# Patient Record
Sex: FEMALE | Race: WHITE | Employment: OTHER | URBAN - METROPOLITAN AREA
[De-identification: names, ages, dates, MRNs, and addresses within clinical notes are randomized per-mention and may not be internally consistent; named-entity substitution may affect disease eponyms.]

---
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
## 2021-03-19 ENCOUNTER — HOSPITAL ENCOUNTER (EMERGENCY)
Age: 21
Discharge: HOME | End: 2021-03-19
Attending: EMERGENCY MEDICINE
Payer: COMMERCIAL

## 2021-03-19 ENCOUNTER — APPOINTMENT (OUTPATIENT)
Dept: GENERAL RADIOLOGY | Age: 21
End: 2021-03-19
Attending: PHYSICIAN ASSISTANT
Payer: COMMERCIAL

## 2021-03-19 VITALS
TEMPERATURE: 97.9 F | SYSTOLIC BLOOD PRESSURE: 91 MMHG | HEIGHT: 62 IN | DIASTOLIC BLOOD PRESSURE: 73 MMHG | OXYGEN SATURATION: 100 % | HEART RATE: 90 BPM | WEIGHT: 166.23 LBS | RESPIRATION RATE: 30 BRPM | BODY MASS INDEX: 30.59 KG/M2

## 2021-03-19 DIAGNOSIS — K92.0 HEMATEMESIS WITH NAUSEA: Primary | ICD-10-CM

## 2021-03-19 DIAGNOSIS — R11.2 NON-INTRACTABLE VOMITING WITH NAUSEA, UNSPECIFIED VOMITING TYPE: ICD-10-CM

## 2021-03-19 DIAGNOSIS — U07.1 COVID-19: ICD-10-CM

## 2021-03-19 LAB
ALBUMIN SERPL-MCNC: 3.2 G/DL (ref 3.5–5)
ALBUMIN/GLOB SERPL: 0.7 {RATIO} (ref 1.1–2.2)
ALP SERPL-CCNC: 129 U/L (ref 45–117)
ALT SERPL-CCNC: 21 U/L (ref 12–78)
ANION GAP SERPL CALC-SCNC: 4 MMOL/L (ref 5–15)
APPEARANCE UR: CLEAR
AST SERPL-CCNC: 17 U/L (ref 15–37)
BACTERIA URNS QL MICRO: NEGATIVE /HPF
BASOPHILS # BLD: 0.1 K/UL (ref 0–0.1)
BASOPHILS NFR BLD: 1 % (ref 0–1)
BILIRUB SERPL-MCNC: 0.2 MG/DL (ref 0.2–1)
BILIRUB UR QL: NEGATIVE
BUN SERPL-MCNC: 10 MG/DL (ref 6–20)
BUN/CREAT SERPL: 16 (ref 12–20)
CALCIUM SERPL-MCNC: 8.9 MG/DL (ref 8.5–10.1)
CHLORIDE SERPL-SCNC: 104 MMOL/L (ref 97–108)
CO2 SERPL-SCNC: 28 MMOL/L (ref 21–32)
COLOR UR: ABNORMAL
CREAT SERPL-MCNC: 0.61 MG/DL (ref 0.55–1.02)
DIFFERENTIAL METHOD BLD: NORMAL
EOSINOPHIL # BLD: 0.1 K/UL (ref 0–0.4)
EOSINOPHIL NFR BLD: 1 % (ref 0–7)
EPITH CASTS URNS QL MICRO: ABNORMAL /LPF
ERYTHROCYTE [DISTWIDTH] IN BLOOD BY AUTOMATED COUNT: 13 % (ref 11.5–14.5)
GLOBULIN SER CALC-MCNC: 4.4 G/DL (ref 2–4)
GLUCOSE SERPL-MCNC: 108 MG/DL (ref 65–100)
GLUCOSE UR STRIP.AUTO-MCNC: NEGATIVE MG/DL
HCG UR QL: NEGATIVE
HCT VFR BLD AUTO: 41.2 % (ref 35–47)
HGB BLD-MCNC: 13.3 G/DL (ref 11.5–16)
HGB UR QL STRIP: NEGATIVE
IMM GRANULOCYTES # BLD AUTO: 0 K/UL (ref 0–0.04)
IMM GRANULOCYTES NFR BLD AUTO: 0 % (ref 0–0.5)
KETONES UR QL STRIP.AUTO: NEGATIVE MG/DL
LEUKOCYTE ESTERASE UR QL STRIP.AUTO: NEGATIVE
LYMPHOCYTES # BLD: 2.1 K/UL (ref 0.8–3.5)
LYMPHOCYTES NFR BLD: 42 % (ref 12–49)
MCH RBC QN AUTO: 28.8 PG (ref 26–34)
MCHC RBC AUTO-ENTMCNC: 32.3 G/DL (ref 30–36.5)
MCV RBC AUTO: 89.2 FL (ref 80–99)
MONOCYTES # BLD: 0.6 K/UL (ref 0–1)
MONOCYTES NFR BLD: 12 % (ref 5–13)
NEUTS SEG # BLD: 2.2 K/UL (ref 1.8–8)
NEUTS SEG NFR BLD: 44 % (ref 32–75)
NITRITE UR QL STRIP.AUTO: NEGATIVE
NRBC # BLD: 0 K/UL (ref 0–0.01)
NRBC BLD-RTO: 0 PER 100 WBC
PH UR STRIP: 7.5 [PH] (ref 5–8)
PLATELET # BLD AUTO: 336 K/UL (ref 150–400)
PMV BLD AUTO: 9.3 FL (ref 8.9–12.9)
POTASSIUM SERPL-SCNC: 3.9 MMOL/L (ref 3.5–5.1)
PROT SERPL-MCNC: 7.6 G/DL (ref 6.4–8.2)
PROT UR STRIP-MCNC: NEGATIVE MG/DL
RBC # BLD AUTO: 4.62 M/UL (ref 3.8–5.2)
RBC #/AREA URNS HPF: ABNORMAL /HPF (ref 0–5)
RBC MORPH BLD: NORMAL
SODIUM SERPL-SCNC: 136 MMOL/L (ref 136–145)
SP GR UR REFRACTOMETRY: 1.03 (ref 1–1.03)
TROPONIN I SERPL-MCNC: <0.05 NG/ML
UA: UC IF INDICATED,UAUC: ABNORMAL
UROBILINOGEN UR QL STRIP.AUTO: 0.2 EU/DL (ref 0.2–1)
WBC # BLD AUTO: 5.1 K/UL (ref 3.6–11)
WBC URNS QL MICRO: ABNORMAL /HPF (ref 0–4)

## 2021-03-19 PROCEDURE — 36415 COLL VENOUS BLD VENIPUNCTURE: CPT

## 2021-03-19 PROCEDURE — 96375 TX/PRO/DX INJ NEW DRUG ADDON: CPT

## 2021-03-19 PROCEDURE — 84484 ASSAY OF TROPONIN QUANT: CPT

## 2021-03-19 PROCEDURE — 74011250636 HC RX REV CODE- 250/636: Performed by: EMERGENCY MEDICINE

## 2021-03-19 PROCEDURE — 99284 EMERGENCY DEPT VISIT MOD MDM: CPT

## 2021-03-19 PROCEDURE — 74011000250 HC RX REV CODE- 250: Performed by: EMERGENCY MEDICINE

## 2021-03-19 PROCEDURE — 96374 THER/PROPH/DIAG INJ IV PUSH: CPT

## 2021-03-19 PROCEDURE — 71045 X-RAY EXAM CHEST 1 VIEW: CPT

## 2021-03-19 PROCEDURE — 93005 ELECTROCARDIOGRAM TRACING: CPT

## 2021-03-19 PROCEDURE — 85025 COMPLETE CBC W/AUTO DIFF WBC: CPT

## 2021-03-19 PROCEDURE — 81025 URINE PREGNANCY TEST: CPT

## 2021-03-19 PROCEDURE — 80053 COMPREHEN METABOLIC PANEL: CPT

## 2021-03-19 PROCEDURE — 81001 URINALYSIS AUTO W/SCOPE: CPT

## 2021-03-19 PROCEDURE — 96361 HYDRATE IV INFUSION ADD-ON: CPT

## 2021-03-19 RX ORDER — FAMOTIDINE 20 MG/1
20 TABLET, FILM COATED ORAL 2 TIMES DAILY
Qty: 28 TAB | Refills: 0 | Status: SHIPPED | OUTPATIENT
Start: 2021-03-19 | End: 2021-04-02

## 2021-03-19 RX ORDER — NORGESTIMATE AND ETHINYL ESTRADIOL 0.25-0.035
1 KIT ORAL DAILY
Status: ON HOLD | COMMUNITY
End: 2022-08-05

## 2021-03-19 RX ORDER — QUETIAPINE FUMARATE 300 MG/1
300 TABLET, FILM COATED ORAL 2 TIMES DAILY
COMMUNITY
End: 2022-06-03

## 2021-03-19 RX ORDER — ONDANSETRON 4 MG/1
4 TABLET, ORALLY DISINTEGRATING ORAL
Qty: 21 TAB | Refills: 0 | Status: SHIPPED | OUTPATIENT
Start: 2021-03-19 | End: 2021-03-26

## 2021-03-19 RX ORDER — ONDANSETRON 2 MG/ML
4 INJECTION INTRAMUSCULAR; INTRAVENOUS
Status: DISCONTINUED | OUTPATIENT
Start: 2021-03-19 | End: 2021-03-20 | Stop reason: HOSPADM

## 2021-03-19 RX ORDER — LEVONORGESTREL 19.5 MG/1
INTRAUTERINE DEVICE INTRAUTERINE
COMMUNITY

## 2021-03-19 RX ADMIN — SODIUM CHLORIDE 1000 ML: 9 INJECTION, SOLUTION INTRAVENOUS at 19:53

## 2021-03-19 RX ADMIN — ONDANSETRON 4 MG: 2 INJECTION INTRAMUSCULAR; INTRAVENOUS at 19:53

## 2021-03-19 RX ADMIN — FAMOTIDINE 20 MG: 10 INJECTION INTRAVENOUS at 20:23

## 2021-03-20 ENCOUNTER — PATIENT OUTREACH (OUTPATIENT)
Dept: CASE MANAGEMENT | Age: 21
End: 2021-03-20

## 2021-03-20 LAB
ATRIAL RATE: 105 BPM
CALCULATED P AXIS, ECG09: 53 DEGREES
CALCULATED R AXIS, ECG10: 58 DEGREES
CALCULATED T AXIS, ECG11: 3 DEGREES
DIAGNOSIS, 93000: NORMAL
P-R INTERVAL, ECG05: 144 MS
Q-T INTERVAL, ECG07: 316 MS
QRS DURATION, ECG06: 82 MS
QTC CALCULATION (BEZET), ECG08: 417 MS
VENTRICULAR RATE, ECG03: 105 BPM

## 2021-03-20 NOTE — ED PROVIDER NOTES
EMERGENCY DEPARTMENT HISTORY AND PHYSICAL EXAM      Date: 3/19/2021  Patient Name: Myesha Walls    History of Presenting Illness     Chief Complaint   Patient presents with    Vomiting     pt states she has been vomiting blood since this morning, pt states she is COVID19 positive since 3/17/2021.  Positive For Covid-19    Chest Congestion       History Provided By: Patient    HPI: Myesha Walls, 24 y.o. female  With past medical history of oral and personality disorder, migraine headaches presenting today with vomiting. The patient says that she was diagnosed with Covid 2 days ago. She has had 2 days where she had vomiting with blood in her emesis. She notes that she has had a violent emesis and notes that the blood in her vomit was bright red. She denies any recent NSAID use or aspirin use. No history of alcohol use. The patient says that she has mild chest discomfort, and some mild epigastric abdominal discomfort. Currently she says that she is hungry and wishes to have something to eat. She has not had any fevers at home. No cough or shortness of breath associated with this recent diagnosis of Covid. There are no other complaints, changes, or physical findings at this time. PCP: Sanjiv Andrade NP    No current facility-administered medications on file prior to encounter. Current Outpatient Medications on File Prior to Encounter   Medication Sig Dispense Refill    norgestimate-ethinyl estradioL (Sprintec, 28,) 0.25-35 mg-mcg tab Take 1 Tab by mouth daily.  QUEtiapine (SEROquel) 300 mg tablet Take 300 mg by mouth two (2) times a day.  buPROPion SR (Wellbutrin SR) 150 mg SR tablet Take 150 mg by mouth two (2) times a day.  metFORMIN (GLUMETZA ER) 500 mg TG24 24 hour tablet Take  by mouth.  escitalopram oxalate (LEXAPRO) 20 mg tablet Take 10 mg by mouth daily. Indications: Depression      guanFACINE ER (INTUNIV) 3 mg ER tablet Take 3 mg by mouth daily.       lamoTRIgine (LAMICTAL) 200 mg tablet Take 200 mg by mouth nightly.  levonorgestreL (Kyleena) 17.5 mcg/24 hrs (5 yrs) 19.5 mg IUD IUD Kyleena 17.5 mcg/24 hrs (5yrs) 19.5mg intrauterine device   Take by intrauterine route.  EPINEPHrine (EPIPEN) 0.3 mg/0.3 mL injection 0.3 mg by IntraMUSCular route once as needed. Past History     Past Medical History:  Past Medical History:   Diagnosis Date    ADHD (attention deficit hyperactivity disorder)     Asthma     exercise induced    Bipolar affective ([address]     Borderline personality disorder (HCC)     GERD (gastroesophageal reflux disease)     Nicotine vapor product user     Psychiatric disorder     anxiety, depression, SI's, bipolar depression    Suicidal thoughts        Past Surgical History:  Past Surgical History:   Procedure Laterality Date    HX CHOLECYSTECTOMY      HX HEENT      foreign body removal from ear with ketamine       Family History:  No family history on file. Social History:  Social History     Tobacco Use    Smoking status: Never Smoker    Smokeless tobacco: Current User    Tobacco comment: Vaping   Substance Use Topics    Alcohol use: Not Currently     Comment: maybe three times a year    Drug use: No       Allergies: Allergies   Allergen Reactions    Bee Sting [Sting, Bee] Anaphylaxis    Poison Ivy Extract Anaphylaxis         Review of Systems   Constitutional: No  fever  Skin: No  rash  HEENT: No  nasal congestion  Resp: No cough  CV: No chest pain  GI: + vomiting  : No dysuria  MSK: No joint pain  Neuro: No numbness  Psych: No anxiety      Physical Exam     Patient Vitals for the past 12 hrs:   Temp Pulse Resp BP SpO2   03/19/21 1948 97.9 °F (36.6 °C) 90 30 91/73 100 %   03/19/21 1751 97.9 °F (36.6 °C) (!) 110 20 121/79 99 %     General: alert, No acute distress  Eyes: EOMI, normal conjunctiva  ENT: moist mucous membranes. Neck: Active, full ROM of neck. Skin: No rashes. no jaundice              Lungs: Equal chest expansion. no respiratory distress. clear to auscultation bilaterally No accessory muscle usage  Heart: regular rate     no peripheral edema   2+ radial pulses  bilaterally  Abd:  non distended soft, nontender. No rebound tenderness. No guarding  Back: Full ROM  MSK: Full, active ROM in all 4 extremities.    Neuro: Alert and oriented to Person, Place, Time and Situation; normal speech;   Psych: Cooperative with exam; Appropriate mood and affect           Diagnostic Study Results     Labs -     Recent Results (from the past 12 hour(s))   URINALYSIS W/ REFLEX CULTURE    Collection Time: 03/19/21  5:58 PM    Specimen: Urine   Result Value Ref Range    Color YELLOW/STRAW      Appearance CLEAR CLEAR      Specific gravity 1.027 1.003 - 1.030      pH (UA) 7.5 5.0 - 8.0      Protein Negative NEG mg/dL    Glucose Negative NEG mg/dL    Ketone Negative NEG mg/dL    Bilirubin Negative NEG      Blood Negative NEG      Urobilinogen 0.2 0.2 - 1.0 EU/dL    Nitrites Negative NEG      Leukocyte Esterase Negative NEG      WBC 5-10 0 - 4 /hpf    RBC 0-5 0 - 5 /hpf    Epithelial cells MANY (A) FEW /lpf    Bacteria Negative NEG /hpf    UA:UC IF INDICATED CULTURE NOT INDICATED BY UA RESULT CNI     EKG, 12 LEAD, INITIAL    Collection Time: 03/19/21  5:58 PM   Result Value Ref Range    Ventricular Rate 105 BPM    Atrial Rate 105 BPM    P-R Interval 144 ms    QRS Duration 82 ms    Q-T Interval 316 ms    QTC Calculation (Bezet) 417 ms    Calculated P Axis 53 degrees    Calculated R Axis 58 degrees    Calculated T Axis 3 degrees    Diagnosis       Sinus tachycardia  Possible Left atrial enlargement  Nonspecific T wave abnormality  When compared with ECG of 26-AUG-2020 09:34,  Nonspecific T wave abnormality now evident in Lateral leads     HCG URINE, QL. - POC    Collection Time: 03/19/21  5:59 PM   Result Value Ref Range    Pregnancy test,urine (POC) Negative NEG     CBC WITH AUTOMATED DIFF    Collection Time: 03/19/21  6:05 PM   Result Value Ref Range    WBC 5.1 3.6 - 11.0 K/uL    RBC 4.62 3.80 - 5.20 M/uL    HGB 13.3 11.5 - 16.0 g/dL    HCT 41.2 35.0 - 47.0 %    MCV 89.2 80.0 - 99.0 FL    MCH 28.8 26.0 - 34.0 PG    MCHC 32.3 30.0 - 36.5 g/dL    RDW 13.0 11.5 - 14.5 %    PLATELET 442 292 - 695 K/uL    MPV 9.3 8.9 - 12.9 FL    NRBC 0.0 0  WBC    ABSOLUTE NRBC 0.00 0.00 - 0.01 K/uL    NEUTROPHILS 44 32 - 75 %    LYMPHOCYTES 42 12 - 49 %    MONOCYTES 12 5 - 13 %    EOSINOPHILS 1 0 - 7 %    BASOPHILS 1 0 - 1 %    IMMATURE GRANULOCYTES 0 0.0 - 0.5 %    ABS. NEUTROPHILS 2.2 1.8 - 8.0 K/UL    ABS. LYMPHOCYTES 2.1 0.8 - 3.5 K/UL    ABS. MONOCYTES 0.6 0.0 - 1.0 K/UL    ABS. EOSINOPHILS 0.1 0.0 - 0.4 K/UL    ABS. BASOPHILS 0.1 0.0 - 0.1 K/UL    ABS. IMM. GRANS. 0.0 0.00 - 0.04 K/UL    DF MANUAL      RBC COMMENTS NORMOCYTIC, NORMOCHROMIC     METABOLIC PANEL, COMPREHENSIVE    Collection Time: 03/19/21  6:05 PM   Result Value Ref Range    Sodium 136 136 - 145 mmol/L    Potassium 3.9 3.5 - 5.1 mmol/L    Chloride 104 97 - 108 mmol/L    CO2 28 21 - 32 mmol/L    Anion gap 4 (L) 5 - 15 mmol/L    Glucose 108 (H) 65 - 100 mg/dL    BUN 10 6 - 20 MG/DL    Creatinine 0.61 0.55 - 1.02 MG/DL    BUN/Creatinine ratio 16 12 - 20      GFR est AA >60 >60 ml/min/1.73m2    GFR est non-AA >60 >60 ml/min/1.73m2    Calcium 8.9 8.5 - 10.1 MG/DL    Bilirubin, total 0.2 0.2 - 1.0 MG/DL    ALT (SGPT) 21 12 - 78 U/L    AST (SGOT) 17 15 - 37 U/L    Alk.  phosphatase 129 (H) 45 - 117 U/L    Protein, total 7.6 6.4 - 8.2 g/dL    Albumin 3.2 (L) 3.5 - 5.0 g/dL    Globulin 4.4 (H) 2.0 - 4.0 g/dL    A-G Ratio 0.7 (L) 1.1 - 2.2     TROPONIN I    Collection Time: 03/19/21  6:05 PM   Result Value Ref Range    Troponin-I, Qt. <0.05 <0.05 ng/mL       Radiologic Studies -   XR CHEST PORT   Final Result   No acute cardiopulmonary process        CT Results  (Last 48 hours)    None        CXR Results  (Last 48 hours)               03/19/21 1919  XR CHEST PORT Final result    Impression:  No acute cardiopulmonary process       Narrative:  EXAM: XR CHEST PORT       INDICATION: chest pain, cough, COVID+       COMPARISON: None. FINDINGS: A portable AP radiograph of the chest was obtained at hours. The   patient is on a cardiac monitor. The lungs are clear. The cardiac and   mediastinal contours and pulmonary vascularity are normal.  The bones and soft   tissues are grossly within normal limits. Medical Decision Making   I am the first provider for this patient. I reviewed the vital signs, available nursing notes, past medical history, past surgical history, family history and social history. Vital Signs-Reviewed the patient's vital signs. Patient Vitals for the past 12 hrs:   Temp Pulse Resp BP SpO2   03/19/21 1948 97.9 °F (36.6 °C) 90 30 91/73 100 %   03/19/21 1751 97.9 °F (36.6 °C) (!) 110 20 121/79 99 %       Provider Notes (Medical Decision Making):     Differential Diagnosis: Covid, anemia, gastritis, Emely-De La Rosa tear, Boerhaave's syndrome    Initial Plan: Will obtain basic laboratory studies, chest x-ray, EKG, treat with famotidine IV fluids and ondansetron and reassess. ED Course:   Initial assessment performed. The patients presenting problems have been discussed, and they are in agreement with the care plan formulated and outlined with them. I have encouraged them to ask questions as they arise throughout their visit. ED Course as of Mar 20 0020   Fri Mar 19, 2021   2018 My interpretation the patient's laboratory studies metabolic panel is largely unremarkable, CBC unremarkable, troponin negative, and urinalysis no evidence of infection. [NW]   2018 On my interpretation of the patient's chest x-ray no evidence of mediastinal air or infiltrate.    [NW]   2018 On my interpretation of the patient's EKG today his tachycardia at a rate of 105, QTC is 417, no ST elevation or depression.    [NW]   2110 Presenting today with vomiting with blood in her emesis.   She has not had any further episodes here and feels symptomatically improved. She has no evidence of Boerhaave syndrome, no further vomiting here. Likely gastritis related to her current COVID-19 infection, does not have risk factors for bleeding ulcer. I will treat her with Imodium at home have her follow-up with her primary care physician.    [NW]      ED Course User Index  [NW] Robin Holder MD       I, Saúl Lisa MD, am the attending of record for this patient encounter. Dispo: Discharged. The patient has been re-evaluated and is ready for discharge. Reviewed available results with patient. Counseled patient on diagnosis and care plan. Patient has expressed understanding, and all questions have been answered. Patient agrees with plan and agrees to follow up as recommended, or to return to the ED if their symptoms worsen. Discharge instructions have been provided and explained to the patient, along with reasons to return to the ED. PLAN:  Discharge Medication List as of 3/19/2021  9:12 PM      START taking these medications    Details   famotidine (PEPCID) 20 mg tablet Take 1 Tab by mouth two (2) times a day for 14 days. , Normal, Disp-28 Tab, R-0      ondansetron (ZOFRAN ODT) 4 mg disintegrating tablet Take 1 Tab by mouth every eight (8) hours as needed for Nausea for up to 7 days. , Normal, Disp-21 Tab, R-0         CONTINUE these medications which have NOT CHANGED    Details   norgestimate-ethinyl estradioL (Sprintec, 28,) 0.25-35 mg-mcg tab Take 1 Tab by mouth daily. , Historical Med      QUEtiapine (SEROquel) 300 mg tablet Take 300 mg by mouth two (2) times a day., Historical Med      buPROPion SR (Wellbutrin SR) 150 mg SR tablet Take 150 mg by mouth two (2) times a day., Historical Med      metFORMIN (GLUMETZA ER) 500 mg TG24 24 hour tablet Take  by mouth., Historical Med      escitalopram oxalate (LEXAPRO) 20 mg tablet Take 10 mg by mouth daily.  Indications: Depression, Historical Med      guanFACINE ER (INTUNIV) 3 mg ER tablet Take 3 mg by mouth daily. , Historical Med      lamoTRIgine (LAMICTAL) 200 mg tablet Take 200 mg by mouth nightly., Historical Med      levonorgestreL (Kyleena) 17.5 mcg/24 hrs (5 yrs) 19.5 mg IUD IUD Kyleena 17.5 mcg/24 hrs (5yrs) 19.5mg intrauterine device   Take by intrauterine route., Historical Med      EPINEPHrine (EPIPEN) 0.3 mg/0.3 mL injection 0.3 mg by IntraMUSCular route once as needed., Historical Med           2. Follow-up Information     Follow up With Specialties Details Why [address], [address] [facility], NP Nurse Practitioner   [address]  [phone]          3. Return to ED if worse       Diagnosis     Clinical Impression:   1. Hematemesis with nausea    2. Non-intractable vomiting with nausea, unspecified vomiting type    3. COVID-19        Attestations:    Arya Rajan MD    Please note that this dictation was completed with Gather, the D1G voice recognition software. Quite often unanticipated grammatical, syntax, homophones, and other interpretive errors are inadvertently transcribed by the computer software. Please disregard these errors. Please excuse any errors that have escaped final proofreading. Thank you.

## 2021-03-20 NOTE — ED NOTES
MD Femi García reviewed discharge instructions with the patient. The patient verbalized understanding. Patient discharged home with stable vitals. Patient out of ED with discharge instructions in hand. Opportunity for questions and clarification provided. No further complaints noted at this time.

## 2021-03-20 NOTE — PROGRESS NOTES
Patient contacted regarding Quirino Cantor. Discussed COVID-19 related testing which was not done at this time. Test results were not done. Patient informed of results, if available? n/a     LPN Care Coordinator contacted the parent by telephone to perform post discharge assessment. Call within 2 business days of discharge: Yes Verified name and  with parent as identifiers. Provided introduction to self, and explanation of the CTN/ACM role, and reason for call due to risk factors for infection and/or exposure to COVID-19. Symptoms reviewed with parent who verbalized the following symptoms: no worsening symptoms      Due to no new or worsening symptoms encounter was not routed to provider for escalation. Discussed follow-up appointments. If no appointment was previously scheduled, appointment scheduling offered:  [facility] follow up appointment(s): No future appointments. Non-[facility] follow up appointment(s): Follow-up with primary care provider. Advance Care Planning:   Does patient have an Advance Directive:  not on file; education provided. Patient has following risk factors of: no known risk factors. LPN CC reviewed discharge instructions, medical action plan and red flags such as increased shortness of breath, increasing fever and signs of decompensation with parent who verbalized understanding. Discussed exposure protocols and quarantine with CDC Guidelines What to do if you are sick with coronavirus disease .  Parent was given an opportunity for questions and concerns. The parent agrees to contact the [facility] exposure line [phone], [facility] [address]  ([phone] and PCP office for questions related to their healthcare. LPN CC provided contact information for future needs. Reviewed and educated parent on any new and changed medications related to discharge diagnosis     Was patient discharged with a pulse oximeter?  no Discussed and confirmed pulse oximeter discharge instructions and when to notify provider or seek emergency care. Patient/family/caregiver given information for [employer] and agrees to enroll no  Patient's preferred e-mail: n/a   Patient's preferred phone number: n/a  Based on Loop alert triggers, patient will be contacted by nurse care manager for worsening symptoms. Plan for follow-up call in 7-14 days based on severity of symptoms and risk factors.

## 2021-04-12 ENCOUNTER — PATIENT OUTREACH (OUTPATIENT)
Dept: CASE MANAGEMENT | Age: 21
End: 2021-04-12

## 2021-04-12 NOTE — PROGRESS NOTES
Patient resolved from Transition of Care episode on 04/12/21. ACM/CTN was unsuccessful at contacting this patient today. Patient/family was provided the following resources and education related to COVID-19 during the initial call:                         Signs, symptoms and red flags related to COVID-19            CDC exposure and quarantine guidelines            [facility] exposure contact - [phone]            Contact for their local Department of Health                 Patient has not had any additional ED or hospital visits. No further outreach scheduled with this CTN/ACM. Episode of Care resolved. Patient has this CTN/ACM contact information if future needs arise.

## 2021-11-30 ENCOUNTER — HOSPITAL ENCOUNTER (EMERGENCY)
Age: 21
Discharge: HOME | End: 2021-11-30
Attending: EMERGENCY MEDICINE
Payer: COMMERCIAL

## 2021-11-30 ENCOUNTER — APPOINTMENT (OUTPATIENT)
Dept: ULTRASOUND IMAGING | Age: 21
End: 2021-11-30
Attending: PHYSICIAN ASSISTANT
Payer: COMMERCIAL

## 2021-11-30 VITALS
HEART RATE: 100 BPM | RESPIRATION RATE: 24 BRPM | TEMPERATURE: 98.1 F | SYSTOLIC BLOOD PRESSURE: 102 MMHG | HEIGHT: 64 IN | OXYGEN SATURATION: 97 % | WEIGHT: 158.95 LBS | DIASTOLIC BLOOD PRESSURE: 78 MMHG | BODY MASS INDEX: 27.14 KG/M2

## 2021-11-30 DIAGNOSIS — N73.0 PID (ACUTE PELVIC INFLAMMATORY DISEASE): Primary | ICD-10-CM

## 2021-11-30 DIAGNOSIS — R10.2 PELVIC PAIN: ICD-10-CM

## 2021-11-30 DIAGNOSIS — N93.8 DUB (DYSFUNCTIONAL UTERINE BLEEDING): ICD-10-CM

## 2021-11-30 LAB
ALBUMIN SERPL-MCNC: 3.1 G/DL (ref 3.5–5)
ALBUMIN/GLOB SERPL: 0.7 {RATIO} (ref 1.1–2.2)
ALP SERPL-CCNC: 115 U/L (ref 45–117)
ALT SERPL-CCNC: 20 U/L (ref 12–78)
ANION GAP SERPL CALC-SCNC: 10 MMOL/L (ref 5–15)
AST SERPL-CCNC: 12 U/L (ref 15–37)
BASOPHILS # BLD: 0 K/UL (ref 0–0.1)
BASOPHILS NFR BLD: 0 % (ref 0–1)
BILIRUB SERPL-MCNC: 0.2 MG/DL (ref 0.2–1)
BUN SERPL-MCNC: 12 MG/DL (ref 6–20)
BUN/CREAT SERPL: 18 (ref 12–20)
CALCIUM SERPL-MCNC: 9.2 MG/DL (ref 8.5–10.1)
CHLORIDE SERPL-SCNC: 105 MMOL/L (ref 97–108)
CLUE CELLS VAG QL WET PREP: NORMAL
CO2 SERPL-SCNC: 25 MMOL/L (ref 21–32)
CREAT SERPL-MCNC: 0.65 MG/DL (ref 0.55–1.02)
DIFFERENTIAL METHOD BLD: NORMAL
EOSINOPHIL # BLD: 0.2 K/UL (ref 0–0.4)
EOSINOPHIL NFR BLD: 1 % (ref 0–7)
ERYTHROCYTE [DISTWIDTH] IN BLOOD BY AUTOMATED COUNT: 12.1 % (ref 11.5–14.5)
GLOBULIN SER CALC-MCNC: 4.7 G/DL (ref 2–4)
GLUCOSE SERPL-MCNC: 116 MG/DL (ref 65–100)
HCG UR QL: NEGATIVE
HCT VFR BLD AUTO: 40.4 % (ref 35–47)
HGB BLD-MCNC: 13.3 G/DL (ref 11.5–16)
IMM GRANULOCYTES # BLD AUTO: 0 K/UL (ref 0–0.04)
IMM GRANULOCYTES NFR BLD AUTO: 0 % (ref 0–0.5)
KOH PREP SPEC: NORMAL
LYMPHOCYTES # BLD: 3.4 K/UL (ref 0.8–3.5)
LYMPHOCYTES NFR BLD: 32 % (ref 12–49)
MCH RBC QN AUTO: 29.2 PG (ref 26–34)
MCHC RBC AUTO-ENTMCNC: 32.9 G/DL (ref 30–36.5)
MCV RBC AUTO: 88.8 FL (ref 80–99)
MONOCYTES # BLD: 0.8 K/UL (ref 0–1)
MONOCYTES NFR BLD: 8 % (ref 5–13)
NEUTS SEG # BLD: 6.2 K/UL (ref 1.8–8)
NEUTS SEG NFR BLD: 59 % (ref 32–75)
NRBC # BLD: 0 K/UL (ref 0–0.01)
NRBC BLD-RTO: 0 PER 100 WBC
PLATELET # BLD AUTO: 369 K/UL (ref 150–400)
PMV BLD AUTO: 9.2 FL (ref 8.9–12.9)
POTASSIUM SERPL-SCNC: 3.6 MMOL/L (ref 3.5–5.1)
PROT SERPL-MCNC: 7.8 G/DL (ref 6.4–8.2)
RBC # BLD AUTO: 4.55 M/UL (ref 3.8–5.2)
SERVICE CMNT-IMP: NORMAL
SODIUM SERPL-SCNC: 140 MMOL/L (ref 136–145)
T VAGINALIS VAG QL WET PREP: NORMAL
WBC # BLD AUTO: 10.6 K/UL (ref 3.6–11)

## 2021-11-30 PROCEDURE — 96374 THER/PROPH/DIAG INJ IV PUSH: CPT

## 2021-11-30 PROCEDURE — 80053 COMPREHEN METABOLIC PANEL: CPT

## 2021-11-30 PROCEDURE — 76830 TRANSVAGINAL US NON-OB: CPT

## 2021-11-30 PROCEDURE — 87491 CHLMYD TRACH DNA AMP PROBE: CPT

## 2021-11-30 PROCEDURE — 87210 SMEAR WET MOUNT SALINE/INK: CPT

## 2021-11-30 PROCEDURE — 81025 URINE PREGNANCY TEST: CPT

## 2021-11-30 PROCEDURE — 96372 THER/PROPH/DIAG INJ SC/IM: CPT

## 2021-11-30 PROCEDURE — 99284 EMERGENCY DEPT VISIT MOD MDM: CPT

## 2021-11-30 PROCEDURE — 74011250637 HC RX REV CODE- 250/637: Performed by: PHYSICIAN ASSISTANT

## 2021-11-30 PROCEDURE — 86900 BLOOD TYPING SEROLOGIC ABO: CPT

## 2021-11-30 PROCEDURE — 76856 US EXAM PELVIC COMPLETE: CPT

## 2021-11-30 PROCEDURE — 85025 COMPLETE CBC W/AUTO DIFF WBC: CPT

## 2021-11-30 PROCEDURE — 74011000250 HC RX REV CODE- 250: Performed by: PHYSICIAN ASSISTANT

## 2021-11-30 PROCEDURE — 74011250636 HC RX REV CODE- 250/636: Performed by: PHYSICIAN ASSISTANT

## 2021-11-30 RX ORDER — DOXYCYCLINE HYCLATE 100 MG
100 TABLET ORAL 2 TIMES DAILY
Qty: 14 TABLET | Refills: 0 | Status: SHIPPED | OUTPATIENT
Start: 2021-11-30 | End: 2021-12-07

## 2021-11-30 RX ORDER — HYDROCODONE BITARTRATE AND ACETAMINOPHEN 5; 325 MG/1; MG/1
1 TABLET ORAL
Status: COMPLETED | OUTPATIENT
Start: 2021-11-30 | End: 2021-11-30

## 2021-11-30 RX ORDER — KETOROLAC TROMETHAMINE 30 MG/ML
15 INJECTION, SOLUTION INTRAMUSCULAR; INTRAVENOUS
Status: COMPLETED | OUTPATIENT
Start: 2021-11-30 | End: 2021-11-30

## 2021-11-30 RX ORDER — HYDROCODONE BITARTRATE AND ACETAMINOPHEN 5; 325 MG/1; MG/1
1 TABLET ORAL
Qty: 8 TABLET | Refills: 0 | Status: SHIPPED | OUTPATIENT
Start: 2021-11-30 | End: 2021-12-03

## 2021-11-30 RX ORDER — AZITHROMYCIN 250 MG/1
1000 TABLET, FILM COATED ORAL
Status: COMPLETED | OUTPATIENT
Start: 2021-11-30 | End: 2021-11-30

## 2021-11-30 RX ADMIN — HYDROCODONE BITARTRATE AND ACETAMINOPHEN 1 TABLET: 5; 325 TABLET ORAL at 20:01

## 2021-11-30 RX ADMIN — KETOROLAC TROMETHAMINE 15 MG: 30 INJECTION, SOLUTION INTRAMUSCULAR; INTRAVENOUS at 20:02

## 2021-11-30 RX ADMIN — AZITHROMYCIN 1000 MG: 250 TABLET, FILM COATED ORAL at 20:59

## 2021-11-30 RX ADMIN — LIDOCAINE HYDROCHLORIDE 500 MG: 10 INJECTION, SOLUTION EPIDURAL; INFILTRATION; INTRACAUDAL; PERINEURAL at 20:59

## 2021-11-30 NOTE — Clinical Note
[address]  [facility] EMERGENCY DEPT  [address] [facility] [address]  [phone]    Work/School Note    Date: 11/30/2021    To Whom It May concern:    Ruiz Pantoja was seen and treated today in the emergency room by the following provider(s):  Attending Provider: Gemma Krabbe, MD  Physician Assistant: Vanessa Mann. Ruiz Pantoja is excused from work/school on 11/30/21 and 12/01/21. She is medically clear to return to work/school on 12/2/2021.        Sincerely,          Vanessa Linn

## 2021-12-01 LAB
ABO + RH BLD: NORMAL
BLOOD BANK CMNT PATIENT-IMP: NORMAL

## 2021-12-01 NOTE — ED PROVIDER NOTES
EMERGENCY DEPARTMENT HISTORY AND PHYSICAL EXAM      Date: 11/30/2021  Patient Name: Iron Rodriguez    History of Presenting Illness     Chief Complaint   Patient presents with    Vaginal Bleeding     onset with bleeding after sex, states that she has an IUD and that she feels like something has rippled, end to end pad saturation times 6 pads today has been bleeding since Thursday and thought it was her regular mensus but the bleeding has not slowed down       History Provided By: Patient    HPI: Iron Rodriguez, 22 y.o. female presents ambulatory to the Emergency Dept with c/o continued vaginal bleeding and pain she acquired after intercourse several days ago. She reports she has an appt with her Ob/Gyn, Dr. Jigar Salas, at [facility] [place] but came to the ED when the bleeding persisted. \"I think something is torn down there. \" pt states she has an IUD and \"doesn't get periods. \" She denied discharge or odor. She denied h/o similar sx in the past. No fever/chills. No dysuria. No flank pain. She denied N/V/D. She rates her pain a 8/10 on the pain scale and describes it as an aching, stabbing sensation. Pt is o/w healthy without cough, congestion, ST, shortness of breath, chest pain. There are no other complaints, changes, or physical findings at this time. PCP: Katharina Lam NP    Current Outpatient Medications   Medication Sig Dispense Refill    HYDROcodone-acetaminophen (NORCO) 5-325 mg per tablet Take 1 Tablet by mouth every eight (8) hours as needed for Pain for up to 3 days. Max Daily Amount: 3 Tablets. 8 Tablet 0    doxycycline (VIBRA-TABS) 100 mg tablet Take 1 Tablet by mouth two (2) times a day for 7 days. 14 Tablet 0    norgestimate-ethinyl estradioL (Sprintec, 28,) 0.25-35 mg-mcg tab Take 1 Tab by mouth daily.  QUEtiapine (SEROquel) 300 mg tablet Take 300 mg by mouth two (2) times a day.       levonorgestreL (Kyleena) 17.5 mcg/24 hrs (5 yrs) 19.5 mg IUD IUD Kyleena 17.5 mcg/24 hrs (5yrs) 19.5mg intrauterine device   Take by intrauterine route.  buPROPion SR (Wellbutrin SR) 150 mg SR tablet Take 150 mg by mouth two (2) times a day.  metFORMIN (GLUMETZA ER) 500 mg TG24 24 hour tablet Take  by mouth.  EPINEPHrine (EPIPEN) 0.3 mg/0.3 mL injection 0.3 mg by IntraMUSCular route once as needed.  escitalopram oxalate (LEXAPRO) 20 mg tablet Take 10 mg by mouth daily. Indications: Depression      guanFACINE ER (INTUNIV) 3 mg ER tablet Take 3 mg by mouth daily.  lamoTRIgine (LAMICTAL) 200 mg tablet Take 200 mg by mouth nightly. Past History     Past Medical History:  Past Medical History:   Diagnosis Date    ADHD (attention deficit hyperactivity disorder)     Asthma     exercise induced    Bipolar affective ([address]     Borderline personality disorder (HCC)     GERD (gastroesophageal reflux disease)     Nicotine vapor product user     Psychiatric disorder     anxiety, depression, SI's, bipolar depression    Suicidal thoughts        Past Surgical History:  Past Surgical History:   Procedure Laterality Date    HX CHOLECYSTECTOMY      HX HEENT      foreign body removal from ear with ketamine       Family History:  History reviewed. No pertinent family history. Social History:  Social History     Tobacco Use    Smoking status: Never Smoker    Smokeless tobacco: Current User    Tobacco comment: Vaping   Substance Use Topics    Alcohol use: Not Currently     Comment: maybe three times a year    Drug use: No       Allergies: Allergies   Allergen Reactions    Bee Sting [Sting, Bee] Anaphylaxis    Cat's Claw Anaphylaxis    Poison Ivy Extract Anaphylaxis         Review of Systems   Review of Systems   Constitutional: Negative for chills and fever. HENT: Negative for congestion, rhinorrhea and sore throat. Eyes: Negative for photophobia and visual disturbance. Respiratory: Negative for cough and shortness of breath.     Cardiovascular: Negative for chest pain and palpitations. Gastrointestinal: Negative for abdominal pain, diarrhea, nausea and vomiting. Endocrine: Negative for polydipsia, polyphagia and polyuria. Genitourinary: Positive for vaginal bleeding. Negative for decreased urine volume, difficulty urinating, dysuria, genital sores, hematuria, pelvic pain, urgency, vaginal discharge and vaginal pain. Musculoskeletal: Negative for neck pain and neck stiffness. Skin: Negative for color change, pallor, rash and wound. Allergic/Immunologic: Negative for food allergies and immunocompromised state. Neurological: Negative for weakness and numbness. Hematological: Negative for adenopathy. Does not bruise/bleed easily. Psychiatric/Behavioral: Negative for agitation and confusion. All other systems reviewed and are negative. Physical Exam   Physical Exam  Vitals and nursing note reviewed. Constitutional:       General: She is not in acute distress. Appearance: Normal appearance. She is well-developed and normal weight. She is not ill-appearing, toxic-appearing or diaphoretic. HENT:      Head: Normocephalic and atraumatic. Nose: Nose normal. No congestion or rhinorrhea. Mouth/Throat:      Mouth: Mucous membranes are moist.      Pharynx: Oropharynx is clear. No oropharyngeal exudate. Eyes:      General: No scleral icterus. Right eye: No discharge. Left eye: No discharge. Extraocular Movements: Extraocular movements intact. Conjunctiva/sclera: Conjunctivae normal.      Pupils: Pupils are equal, round, and reactive to light. Neck:      Thyroid: No thyromegaly. Vascular: No JVD. Trachea: No tracheal deviation. Cardiovascular:      Rate and Rhythm: Normal rate and regular rhythm. Pulses: Normal pulses. Heart sounds: Normal heart sounds. Pulmonary:      Effort: Pulmonary effort is normal. No respiratory distress. Breath sounds: Normal breath sounds. No wheezing.    Abdominal: General: Abdomen is flat. There is no distension. Palpations: Abdomen is soft. Tenderness: There is no abdominal tenderness. There is no right CVA tenderness, left CVA tenderness, guarding or rebound. Musculoskeletal:         General: No deformity. Normal range of motion. Cervical back: Normal range of motion and neck supple. Skin:     General: Skin is warm and dry. Coloration: Skin is not pale. Findings: No erythema or rash. Neurological:      General: No focal deficit present. Mental Status: She is alert and oriented to person, place, and time. Sensory: No sensory deficit. Motor: No weakness or abnormal muscle tone. Coordination: Coordination normal.   Psychiatric:         Mood and Affect: Mood normal.         Behavior: Behavior normal.         Judgment: Judgment normal.       Pelvic Exam  Procedure duration:  10 minutes. Documented by:  Jeannie Saunders PA-C. Exam assisted by:  Tonny Bunch RN. Type of exam performed: bimanual and speculum. External genitalia appearance: normal.    Vaginal exam:  bleeding. Bleeding: mild  Cervical exam:  os closed and moderate cervical motion tenderness. Specimen(s) collected:  GC and chlamydia. Bimanual exam:  uterine enlargement and uterine mass.     Patient tolerance: patient tolerated the procedure well with no immediate complications          Diagnostic Study Results     Labs -     Recent Results (from the past 12 hour(s))   CBC WITH AUTOMATED DIFF    Collection Time: 11/30/21  6:35 PM   Result Value Ref Range    WBC 10.6 3.6 - 11.0 K/uL    RBC 4.55 3.80 - 5.20 M/uL    HGB 13.3 11.5 - 16.0 g/dL    HCT 40.4 35.0 - 47.0 %    MCV 88.8 80.0 - 99.0 FL    MCH 29.2 26.0 - 34.0 PG    MCHC 32.9 30.0 - 36.5 g/dL    RDW 12.1 11.5 - 14.5 %    PLATELET 080 579 - 107 K/uL    MPV 9.2 8.9 - 12.9 FL    NRBC 0.0 0  WBC    ABSOLUTE NRBC 0.00 0.00 - 0.01 K/uL    NEUTROPHILS 59 32 - 75 %    LYMPHOCYTES 32 12 - 49 %    MONOCYTES 8 5 - 13 %    EOSINOPHILS 1 0 - 7 %    BASOPHILS 0 0 - 1 %    IMMATURE GRANULOCYTES 0 0.0 - 0.5 %    ABS. NEUTROPHILS 6.2 1.8 - 8.0 K/UL    ABS. LYMPHOCYTES 3.4 0.8 - 3.5 K/UL    ABS. MONOCYTES 0.8 0.0 - 1.0 K/UL    ABS. EOSINOPHILS 0.2 0.0 - 0.4 K/UL    ABS. BASOPHILS 0.0 0.0 - 0.1 K/UL    ABS. IMM. GRANS. 0.0 0.00 - 0.04 K/UL    DF AUTOMATED     METABOLIC PANEL, COMPREHENSIVE    Collection Time: 11/30/21  6:35 PM   Result Value Ref Range    Sodium 140 136 - 145 mmol/L    Potassium 3.6 3.5 - 5.1 mmol/L    Chloride 105 97 - 108 mmol/L    CO2 25 21 - 32 mmol/L    Anion gap 10 5 - 15 mmol/L    Glucose 116 (H) 65 - 100 mg/dL    BUN 12 6 - 20 MG/DL    Creatinine 0.65 0.55 - 1.02 MG/DL    BUN/Creatinine ratio 18 12 - 20      GFR est AA >60 >60 ml/min/1.73m2    GFR est non-AA >60 >60 ml/min/1.73m2    Calcium 9.2 8.5 - 10.1 MG/DL    Bilirubin, total 0.2 0.2 - 1.0 MG/DL    ALT (SGPT) 20 12 - 78 U/L    AST (SGOT) 12 (L) 15 - 37 U/L    Alk. phosphatase 115 45 - 117 U/L    Protein, total 7.8 6.4 - 8.2 g/dL    Albumin 3.1 (L) 3.5 - 5.0 g/dL    Globulin 4.7 (H) 2.0 - 4.0 g/dL    A-G Ratio 0.7 (L) 1.1 - 2.2     BLOOD TYPE, (ABO+RH)    Collection Time: 11/30/21  6:35 PM   Result Value Ref Range    ABO/Rh(D) O NEGATIVE     Comment SAMPLE NOT USABLE FOR CROSSMATCH    HCG URINE, QL. - POC    Collection Time: 11/30/21  7:30 PM   Result Value Ref Range    Pregnancy test,urine (POC) Negative NEG     WET PREP    Collection Time: 11/30/21  8:20 PM    Specimen: Miscellaneous sample   Result Value Ref Range    Clue cells CLUE CELLS ABSENT      Wet prep NO TRICHOMONAS SEEN     KOH, OTHER SOURCES    Collection Time: 11/30/21  8:20 PM    Specimen: Cervix; Other   Result Value Ref Range    Special Requests: NO SPECIAL REQUESTS      KOH NO YEAST SEEN         Radiologic Studies -   US PELV NON OBS   Final Result   Intrauterine device present otherwise normal pelvic ultrasound by   transabdominal approach.   Correlation with transvaginal ultrasound will be   performed for optimal evaluation of the endometrial stripe and ovaries. Transvaginal ultrasound: Realtime sonographic imaging of the pelvis was   performed transvaginally. The uterus is normal in appearance. The endometrial   stripe measures 7 mm. Intrauterine device projects in satisfactory position. The   right ovary measures 1.6 x 1.1 x 1.1 cm and is normal in appearance. Blood flow   is documented within the right ovary. The left ovary is not visualized. No free   fluid. IMPRESSION: Intrauterine device present. Nonvisualization of the left ovary. Otherwise unremarkable. US TRANSVAGINAL   Final Result   Intrauterine device present otherwise normal pelvic ultrasound by   transabdominal approach. Correlation with transvaginal ultrasound will be   performed for optimal evaluation of the endometrial stripe and ovaries. Transvaginal ultrasound: Realtime sonographic imaging of the pelvis was   performed transvaginally. The uterus is normal in appearance. The endometrial   stripe measures 7 mm. Intrauterine device projects in satisfactory position. The   right ovary measures 1.6 x 1.1 x 1.1 cm and is normal in appearance. Blood flow   is documented within the right ovary. The left ovary is not visualized. No free   fluid. IMPRESSION: Intrauterine device present. Nonvisualization of the left ovary. Otherwise unremarkable. Medical Decision Making   I am the first provider for this patient. I reviewed the vital signs, available nursing notes, past medical history, past surgical history, family history and social history. Vital Signs-Reviewed the patient's vital signs.   Patient Vitals for the past 12 hrs:   Temp Pulse Resp BP SpO2   11/30/21 1626 98.1 °F (36.7 °C) 100 24 102/78 97 %           Records Reviewed: Nursing Notes, Old Medical Records, Previous Radiology Studies and Previous Laboratory Studies    Provider Notes (Medical Decision Making):   STD, PID, ovarian cyst, DUB, uterine fibroid, uterine abscess    ED Course:   Initial assessment performed. The patients presenting problems have been discussed, and they are in agreement with the care plan formulated and outlined with them. I have encouraged them to ask questions as they arise throughout their visit. DISCHARGE NOTE:  The care plan has been outline with the patient and/or family, who verbally conveyed understanding and agreement. Available results have been reviewed. Patient and/or family understand the follow up plan as outlined and discharge instructions. Should their condition deterioration at any time after discharge the patient agrees to return, follow up sooner than outlined or seek medical assistance at the closest Emergency Room as soon as possible. Questions have been answered. Discharge instructions and educational information regarding the patient's diagnosis as well a list of reasons why the patient would want to seek immediate medical attention, should their condition change, were reviewed directly with the patient/family          PLAN:  1. Discharge Medication List as of 11/30/2021  8:53 PM      START taking these medications    Details   HYDROcodone-acetaminophen (NORCO) 5-325 mg per tablet Take 1 Tablet by mouth every eight (8) hours as needed for Pain for up to 3 days. Max Daily Amount: 3 Tablets., Normal, Disp-8 Tablet, R-0      doxycycline (VIBRA-TABS) 100 mg tablet Take 1 Tablet by mouth two (2) times a day for 7 days. , Normal, Disp-14 Tablet, R-0         CONTINUE these medications which have NOT CHANGED    Details   norgestimate-ethinyl estradioL (Sprintec, 28,) 0.25-35 mg-mcg tab Take 1 Tab by mouth daily. , Historical Med      QUEtiapine (SEROquel) 300 mg tablet Take 300 mg by mouth two (2) times a day., Historical Med      levonorgestreL (Kyleena) 17.5 mcg/24 hrs (5 yrs) 19.5 mg IUD IUD Kyleena 17.5 mcg/24 hrs (5yrs) 19.5mg intrauterine device   Take by intrauterine route. , Historical Med      buPROPion SR (Wellbutrin SR) 150 mg SR tablet Take 150 mg by mouth two (2) times a day., Historical Med      metFORMIN (GLUMETZA ER) 500 mg TG24 24 hour tablet Take  by mouth., Historical Med      EPINEPHrine (EPIPEN) 0.3 mg/0.3 mL injection 0.3 mg by IntraMUSCular route once as needed., Historical Med      escitalopram oxalate (LEXAPRO) 20 mg tablet Take 10 mg by mouth daily. Indications: Depression, Historical Med      guanFACINE ER (INTUNIV) 3 mg ER tablet Take 3 mg by mouth daily. , Historical Med      lamoTRIgine (LAMICTAL) 200 mg tablet Take 200 mg by mouth nightly., Historical Med           2. Follow-up Information     Follow up With Specialties Details Why Contact Info    Stevenson Hutchins MD Obstetrics & Gynecology   [address]   [facility]  [phone]      [facility] EMERGENCY DEPT Emergency Medicine  If symptoms worsen [address]  [address] [facility]  [phone]        Return to ED if worse     Diagnosis     Clinical Impression:   1. PID (acute pelvic inflammatory disease)    2. DUB (dysfunctional uterine bleeding)    3.  Pelvic pain

## 2021-12-01 NOTE — DISCHARGE INSTRUCTIONS
Follow up with your Ob/Gyn for recheck. Return to the Emergency Dept for any worsening pain, bleeding, nausea/vomiting, or fever.

## 2021-12-01 NOTE — ED NOTES
Discharge instructions given to patient by Stewart Foreman RN. Verbalized understanding of instructions. Patient discharged without difficulty. Patient discharged in stable condition via ambulation accompanied by Mother.

## 2021-12-03 LAB
C TRACH RRNA SPEC QL NAA+PROBE: POSITIVE
N GONORRHOEA RRNA SPEC QL NAA+PROBE: NEGATIVE
SPECIMEN SOURCE: ABNORMAL

## 2021-12-06 NOTE — PROGRESS NOTES
I just spoke to the patient over the telephone to make her aware of her positive chlamydia test.  She was treated appropriately at the time of discharge. She is instructed to notify her sexual partners of her positive test so they can obtain testing and treatment.

## 2022-02-10 ENCOUNTER — HOSPITAL ENCOUNTER (EMERGENCY)
Age: 22
Discharge: HOME | End: 2022-02-10
Attending: EMERGENCY MEDICINE
Payer: COMMERCIAL

## 2022-02-10 ENCOUNTER — APPOINTMENT (OUTPATIENT)
Dept: GENERAL RADIOLOGY | Age: 22
End: 2022-02-10
Attending: PHYSICIAN ASSISTANT
Payer: COMMERCIAL

## 2022-02-10 VITALS
WEIGHT: 163.14 LBS | HEIGHT: 64 IN | RESPIRATION RATE: 16 BRPM | TEMPERATURE: 97.8 F | BODY MASS INDEX: 27.85 KG/M2 | HEART RATE: 88 BPM | SYSTOLIC BLOOD PRESSURE: 115 MMHG | OXYGEN SATURATION: 100 % | DIASTOLIC BLOOD PRESSURE: 64 MMHG

## 2022-02-10 DIAGNOSIS — S60.211A CONTUSION OF RIGHT WRIST, INITIAL ENCOUNTER: Primary | ICD-10-CM

## 2022-02-10 PROCEDURE — 74011250637 HC RX REV CODE- 250/637: Performed by: PHYSICIAN ASSISTANT

## 2022-02-10 PROCEDURE — 99283 EMERGENCY DEPT VISIT LOW MDM: CPT

## 2022-02-10 PROCEDURE — 73110 X-RAY EXAM OF WRIST: CPT

## 2022-02-10 RX ORDER — IBUPROFEN 600 MG/1
600 TABLET ORAL
Status: COMPLETED | OUTPATIENT
Start: 2022-02-10 | End: 2022-02-10

## 2022-02-10 RX ADMIN — IBUPROFEN 600 MG: 600 TABLET ORAL at 18:01

## 2022-02-10 NOTE — ED PROVIDER NOTES
EMERGENCY DEPARTMENT HISTORY AND PHYSICAL EXAM      Date: 2/10/2022  Patient Name: Nasir Gonzalez    History of Presenting Illness     Chief Complaint   Patient presents with    Wrist Pain     Pt ambulatory into triage with a cc of right wrist pain; a 70lbs dumbell fell on wrist just prior to ED arrival; pt is able to wiggle fingers and sensation is present       History Provided By: Patient    HPI: Nasir Gonzalez, 22 y.o. female with significant PMHx as below, presents to the ED by POV with cc of right (dominate) hand and wrist pain. The patient was at the gym when a 70 lb \"cattle bell\" weight was accidentally dropped on her hand. This occurred just PTA. She denies prior injuries to the right hand/wrist. She applied ice but has not taken any medications for pain. There are no other complaints, changes, or physical findings at this time. Social Hx: Tobacco (vapes), EtOH (denies), Illicit drug use (denies)     PCP: Geni Carter NP    No current facility-administered medications on file prior to encounter. Current Outpatient Medications on File Prior to Encounter   Medication Sig Dispense Refill    norgestimate-ethinyl estradioL (Sprintec, 28,) 0.25-35 mg-mcg tab Take 1 Tab by mouth daily.  QUEtiapine (SEROquel) 300 mg tablet Take 300 mg by mouth two (2) times a day.  levonorgestreL (Kyleena) 17.5 mcg/24 hrs (5 yrs) 19.5 mg IUD IUD Kyleena 17.5 mcg/24 hrs (5yrs) 19.5mg intrauterine device   Take by intrauterine route.  buPROPion SR (Wellbutrin SR) 150 mg SR tablet Take 150 mg by mouth two (2) times a day.  metFORMIN (GLUMETZA ER) 500 mg TG24 24 hour tablet Take  by mouth.  EPINEPHrine (EPIPEN) 0.3 mg/0.3 mL injection 0.3 mg by IntraMUSCular route once as needed.  escitalopram oxalate (LEXAPRO) 20 mg tablet Take 10 mg by mouth daily. Indications: Depression      guanFACINE ER (INTUNIV) 3 mg ER tablet Take 3 mg by mouth daily.       lamoTRIgine (LAMICTAL) 200 mg tablet Take 200 mg by mouth nightly. Past History     Past Medical History:  Past Medical History:   Diagnosis Date    ADHD (attention deficit hyperactivity disorder)     Asthma     exercise induced    Bipolar affective ([address]     Borderline personality disorder (HCC)     GERD (gastroesophageal reflux disease)     Nicotine vapor product user     Psychiatric disorder     anxiety, depression, SI's, bipolar depression    Suicidal thoughts        Past Surgical History:  Past Surgical History:   Procedure Laterality Date    HX CHOLECYSTECTOMY      HX HEENT      foreign body removal from ear with ketamine       Family History:  No family history on file. Social History:  Social History     Tobacco Use    Smoking status: Never Smoker    Smokeless tobacco: Current User    Tobacco comment: Vaping   Substance Use Topics    Alcohol use: Not Currently     Comment: maybe three times a year    Drug use: No       Allergies: Allergies   Allergen Reactions    Bee Sting [Sting, Bee] Anaphylaxis    Cat's Claw Anaphylaxis    Poison Ivy Extract Anaphylaxis         Review of Systems   Review of Systems   Constitutional: Negative for chills, diaphoresis and fever. HENT: Negative for congestion, ear pain, rhinorrhea and sore throat. Respiratory: Negative for cough and shortness of breath. Cardiovascular: Negative for chest pain. Gastrointestinal: Negative for abdominal pain, constipation, diarrhea, nausea and vomiting. Genitourinary: Negative for difficulty urinating, dysuria, frequency and hematuria. Musculoskeletal: Positive for arthralgias. Negative for myalgias. Neurological: Negative for headaches. All other systems reviewed and are negative. Physical Exam   Physical Exam  Vitals and nursing note reviewed. Constitutional:       General: She is not in acute distress. Appearance: She is well-developed. She is not diaphoretic. Comments: 22 y.o.   female    HENT:      Head: Normocephalic and atraumatic. Eyes:      General:         Right eye: No discharge. Left eye: No discharge. Conjunctiva/sclera: Conjunctivae normal.   Cardiovascular:      Rate and Rhythm: Normal rate. Pulses: Normal pulses. Pulmonary:      Effort: Pulmonary effort is normal.   Musculoskeletal:      Cervical back: Normal range of motion and neck supple. Comments: R WRIST: + swelling. No ecchymosis or deformity. FROM of the fingers. Limited ROM of the wrist with pain. Distal NV intact. Cap refill < 2 sec. Ambulatory. Skin:     General: Skin is warm and dry. Neurological:      Mental Status: She is alert and oriented to person, place, and time. Psychiatric:         Behavior: Behavior normal.         Diagnostic Study Results     Labs - none    Radiologic Studies -   XR WRIST RT AP/LAT/OBL MIN 3V   Final Result   No acute abnormality. The above xray(s) have been interpreted independently by me and I agree with the radiologists findings above. Medical Decision Making   I am the first provider for this patient. I reviewed the vital signs, available nursing notes, past medical history, past surgical history, family history and social history. Vital Signs-Reviewed the patient's vital signs. Patient Vitals for the past 12 hrs:   Temp Pulse Resp BP SpO2   02/10/22 1844 -- 88 -- -- --   02/10/22 1721 97.8 °F (36.6 °C) (!) 107 16 115/64 100 %       Records Reviewed: Nursing Notes    Provider Notes (Medical Decision Making):   Patient presents the ED with wrist pain. Differential diagnosis include fracture, sprain, strain, dislocation, contusion. X-rays without acute injury. Patient placed in a wrist immobilizer. She was instructed to elevate, ice, and rest her wrist.  She should follow-up with orthopedics. She should take an over-the-counter anti-inflammatory. She should return to the ED for deterioration. ED Course:   Initial assessment performed.  The patients presenting problems have been discussed, and they are in agreement with the care plan formulated and outlined with them. I have encouraged them to ask questions as they arise throughout their visit. Critical Care Time: None    Disposition:  DISCHARGE NOTE:  6:54 PM  The pt is ready for discharge. The pt's signs, symptoms, diagnosis, and discharge instructions have been discussed and pt has conveyed their understanding. The pt is to follow up as recommended or return to ER should their symptoms worsen. Plan has been discussed and pt is in agreement. PLAN:  1. Current Discharge Medication List        2. Follow-up Information     Follow up With Specialties Details Why Contact Arabella Ace NP Nurse Practitioner In 1 week As needed, If not improved [id number] [facility]  [phone]      Ivette Newman, DO Orthopedic Surgery In 2 days As needed, If not improved [address] [facility]  [address] [id number]      [facility] EMERGENCY DEPT Emergency Medicine  If symptoms worsen [address]  [address] [facility]  [phone]        Return to ED if worse     Diagnosis     Clinical Impression:   1. Contusion of right wrist, initial encounter          Please note that this dictation was completed with [employer], the [id number] voice recognition software. Quite often unanticipated grammatical, syntax, homophones, and other interpretive errors are inadvertently transcribed by the computer software. Please disregards these errors. Please excuse any errors that have escaped final proofreading.

## 2022-02-10 NOTE — DISCHARGE INSTRUCTIONS
It was a pleasure taking care of you at [facility] Emergency Department today. We know that when you come to [facility], you are entrusting us with your health, comfort, and safety. Our physicians and nurses honor that trust, and we truly appreciate the opportunity to care for you and your loved ones. We also value your feedback. If you receive a survey about your Emergency Department experience today, please fill it out. We care about our patients' feedback, and we listen to what you have to say. Thank you!

## 2022-02-10 NOTE — ED NOTES
Palma DOMINGUEZ reviewed discharge instructions with the patient. The patient verbalized understanding. All questions and concerns were addressed. The patient declined a wheelchair and is discharged ambulatory in the care of family members with instructions and prescriptions in hand. Pt is alert and oriented x 4. Respirations are clear and unlabored.

## 2022-03-18 PROBLEM — T50.901A DRUG OVERDOSE: Status: ACTIVE | Noted: 2019-11-05

## 2022-03-18 PROBLEM — R10.11 RUQ PAIN: Status: ACTIVE | Noted: 2019-12-16

## 2022-03-19 PROBLEM — F90.9 ADHD: Status: ACTIVE | Noted: 2019-11-05

## 2022-03-20 PROBLEM — F31.9 BIPOLAR DEPRESSION (HCC): Status: ACTIVE | Noted: 2019-11-05

## 2022-03-20 PROBLEM — T14.91XA SUICIDE ATTEMPT (HCC): Status: ACTIVE | Noted: 2019-11-05

## 2022-03-20 PROBLEM — F33.9 MAJOR DEPRESSION, RECURRENT (HCC): Status: ACTIVE | Noted: 2019-11-05

## 2022-06-03 ENCOUNTER — HOSPITAL ENCOUNTER (EMERGENCY)
Age: 22
Discharge: HOME | End: 2022-06-03
Attending: PEDIATRICS
Payer: COMMERCIAL

## 2022-06-03 VITALS
WEIGHT: 154.54 LBS | HEART RATE: 109 BPM | OXYGEN SATURATION: 100 % | BODY MASS INDEX: 26.53 KG/M2 | RESPIRATION RATE: 16 BRPM | SYSTOLIC BLOOD PRESSURE: 135 MMHG | TEMPERATURE: 98.5 F | DIASTOLIC BLOOD PRESSURE: 92 MMHG

## 2022-06-03 DIAGNOSIS — L55.9 SUNBURN: Primary | ICD-10-CM

## 2022-06-03 DIAGNOSIS — L57.8 DERMATITIS DUE TO SUNBURN: ICD-10-CM

## 2022-06-03 PROCEDURE — 99283 EMERGENCY DEPT VISIT LOW MDM: CPT

## 2022-06-03 RX ORDER — IBUPROFEN 600 MG/1
600 TABLET ORAL
Status: DISCONTINUED | OUTPATIENT
Start: 2022-06-03 | End: 2022-06-03

## 2022-06-03 RX ORDER — HYDROCORTISONE 1 %
CREAM (GRAM) TOPICAL 2 TIMES DAILY
Qty: 30 G | Refills: 0 | Status: ON HOLD | OUTPATIENT
Start: 2022-06-03 | End: 2022-08-05

## 2022-06-03 RX ORDER — HYDROCORTISONE 1 %
CREAM (GRAM) TOPICAL ONCE
Status: DISCONTINUED | OUTPATIENT
Start: 2022-06-03 | End: 2022-06-03

## 2022-06-03 RX ORDER — IBUPROFEN 600 MG/1
600 TABLET ORAL
Qty: 20 TABLET | Refills: 0 | Status: ON HOLD | OUTPATIENT
Start: 2022-06-03 | End: 2022-08-05

## 2022-06-03 NOTE — LETTER
[address]  [facility] DEPT  [address]  [id number] [phone]    Work/School Note    Date: 6/3/2022    To Whom It May concern:    Gomez Estrada was seen and treated today in the emergency room by the following provider(s):  Attending Provider: La Gonzalez MD  Nurse Practitioner: Blanche Murguia NP. Gomez Estrada may return to work on 6/10/22.     Sincerely,          Junior Nelson NP

## 2022-06-04 NOTE — DISCHARGE INSTRUCTIONS
You can take motrin 600 mg by mouth every 6 hours as needed for the pain  Try putting cold aloe on the sunburned areas. You can apply hydrocortisone to your neck area that is itchy. At bedtime you can take benadryl 25-50 mg by mouth for itchiness.
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
## 2022-06-25 NOTE — ED NOTES
Bedside shift change report given to Niyah Birch (oncoming nurse) by Nohemi Jack (offgoing nurse). Report included the following information SBAR. Awake/Alert

## 2022-08-04 ENCOUNTER — HOSPITAL ENCOUNTER (EMERGENCY)
Age: 22
Discharge: HOME | End: 2022-08-05
Attending: EMERGENCY MEDICINE
Payer: MEDICAID

## 2022-08-04 DIAGNOSIS — F32.A DEPRESSION, UNSPECIFIED DEPRESSION TYPE: ICD-10-CM

## 2022-08-04 DIAGNOSIS — R45.851 SUICIDAL IDEATIONS: Primary | ICD-10-CM

## 2022-08-04 DIAGNOSIS — F10.10 ALCOHOL ABUSE: ICD-10-CM

## 2022-08-04 DIAGNOSIS — R74.8 ELEVATED LIVER ENZYMES: ICD-10-CM

## 2022-08-04 LAB
ALBUMIN SERPL-MCNC: 3.7 G/DL (ref 3.5–5)
ALBUMIN/GLOB SERPL: 0.9 {RATIO} (ref 1.1–2.2)
ALP SERPL-CCNC: 204 U/L (ref 45–117)
ALT SERPL-CCNC: 284 U/L (ref 12–78)
AMPHET UR QL SCN: NEGATIVE
ANION GAP SERPL CALC-SCNC: 6 MMOL/L (ref 5–15)
APAP SERPL-MCNC: <2 UG/ML (ref 10–30)
APPEARANCE UR: ABNORMAL
AST SERPL-CCNC: 176 U/L (ref 15–37)
BACTERIA URNS QL MICRO: ABNORMAL /HPF
BARBITURATES UR QL SCN: NEGATIVE
BENZODIAZ UR QL: NEGATIVE
BILIRUB SERPL-MCNC: 1.5 MG/DL (ref 0.2–1)
BILIRUB UR QL CFM: NEGATIVE
BUN SERPL-MCNC: 9 MG/DL (ref 6–20)
BUN/CREAT SERPL: 13 (ref 12–20)
CALCIUM SERPL-MCNC: 9.2 MG/DL (ref 8.5–10.1)
CANNABINOIDS UR QL SCN: POSITIVE
CHLORIDE SERPL-SCNC: 105 MMOL/L (ref 97–108)
CO2 SERPL-SCNC: 27 MMOL/L (ref 21–32)
COCAINE UR QL SCN: NEGATIVE
COLOR UR: ABNORMAL
COVID-19 RAPID TEST, COVR: NOT DETECTED
CREAT SERPL-MCNC: 0.7 MG/DL (ref 0.55–1.02)
DRUG SCRN COMMENT,DRGCM: ABNORMAL
EPITH CASTS URNS QL MICRO: ABNORMAL /LPF
GLOBULIN SER CALC-MCNC: 4.2 G/DL (ref 2–4)
GLUCOSE SERPL-MCNC: 90 MG/DL (ref 65–100)
GLUCOSE UR STRIP.AUTO-MCNC: NEGATIVE MG/DL
HCG UR QL: NEGATIVE
HGB UR QL STRIP: ABNORMAL
KETONES UR QL STRIP.AUTO: ABNORMAL MG/DL
LEUKOCYTE ESTERASE UR QL STRIP.AUTO: ABNORMAL
METHADONE UR QL: NEGATIVE
MUCOUS THREADS URNS QL MICRO: ABNORMAL /LPF
NITRITE UR QL STRIP.AUTO: NEGATIVE
OPIATES UR QL: NEGATIVE
PCP UR QL: NEGATIVE
PH UR STRIP: 6.5 [PH] (ref 5–8)
POTASSIUM SERPL-SCNC: 3.8 MMOL/L (ref 3.5–5.1)
PROT SERPL-MCNC: 7.9 G/DL (ref 6.4–8.2)
PROT UR STRIP-MCNC: 30 MG/DL
RBC #/AREA URNS HPF: ABNORMAL /HPF (ref 0–5)
SALICYLATES SERPL-MCNC: <1.7 MG/DL (ref 2.8–20)
SARS-COV-2, COV2: NORMAL
SODIUM SERPL-SCNC: 138 MMOL/L (ref 136–145)
SOURCE, COVRS: NORMAL
SP GR UR REFRACTOMETRY: 1.02
UA: UC IF INDICATED,UAUC: ABNORMAL
UROBILINOGEN UR QL STRIP.AUTO: 1 EU/DL (ref 0.2–1)
WBC URNS QL MICRO: ABNORMAL /HPF (ref 0–4)

## 2022-08-04 PROCEDURE — 80179 DRUG ASSAY SALICYLATE: CPT

## 2022-08-04 PROCEDURE — 85025 COMPLETE CBC W/AUTO DIFF WBC: CPT

## 2022-08-04 PROCEDURE — 87636 SARSCOV2 & INF A&B AMP PRB: CPT

## 2022-08-04 PROCEDURE — 87635 SARS-COV-2 COVID-19 AMP PRB: CPT

## 2022-08-04 PROCEDURE — 80307 DRUG TEST PRSMV CHEM ANLYZR: CPT

## 2022-08-04 PROCEDURE — 81001 URINALYSIS AUTO W/SCOPE: CPT

## 2022-08-04 PROCEDURE — 36415 COLL VENOUS BLD VENIPUNCTURE: CPT

## 2022-08-04 PROCEDURE — 82077 ASSAY SPEC XCP UR&BREATH IA: CPT

## 2022-08-04 PROCEDURE — 83690 ASSAY OF LIPASE: CPT

## 2022-08-04 PROCEDURE — 80053 COMPREHEN METABOLIC PANEL: CPT

## 2022-08-04 PROCEDURE — 81025 URINE PREGNANCY TEST: CPT

## 2022-08-04 PROCEDURE — 80143 DRUG ASSAY ACETAMINOPHEN: CPT

## 2022-08-04 PROCEDURE — 99285 EMERGENCY DEPT VISIT HI MDM: CPT

## 2022-08-05 ENCOUNTER — APPOINTMENT (OUTPATIENT)
Dept: ULTRASOUND IMAGING | Age: 22
End: 2022-08-05
Attending: EMERGENCY MEDICINE
Payer: MEDICAID

## 2022-08-05 ENCOUNTER — HOSPITAL ENCOUNTER (INPATIENT)
Age: 22
LOS: 3 days | Discharge: HOME | DRG: 753 | End: 2022-08-08
Attending: PSYCHIATRY & NEUROLOGY | Admitting: PSYCHIATRY & NEUROLOGY
Payer: MEDICAID

## 2022-08-05 VITALS
BODY MASS INDEX: 26.13 KG/M2 | DIASTOLIC BLOOD PRESSURE: 80 MMHG | HEIGHT: 62 IN | TEMPERATURE: 99 F | RESPIRATION RATE: 20 BRPM | OXYGEN SATURATION: 98 % | HEART RATE: 86 BPM | WEIGHT: 142 LBS | SYSTOLIC BLOOD PRESSURE: 107 MMHG

## 2022-08-05 DIAGNOSIS — F60.3 BORDERLINE PERSONALITY DISORDER (HCC): ICD-10-CM

## 2022-08-05 DIAGNOSIS — F43.21 ADJUSTMENT DISORDER WITH DEPRESSED MOOD: Primary | ICD-10-CM

## 2022-08-05 PROBLEM — F31.9 BIPOLAR 1 DISORDER (HCC): Status: ACTIVE | Noted: 2022-08-05

## 2022-08-05 LAB
BASOPHILS # BLD: 0 K/UL (ref 0–0.1)
BASOPHILS NFR BLD: 0 % (ref 0–1)
DIFFERENTIAL METHOD BLD: ABNORMAL
EOSINOPHIL # BLD: 0.2 K/UL (ref 0–0.4)
EOSINOPHIL NFR BLD: 2 % (ref 0–7)
ERYTHROCYTE [DISTWIDTH] IN BLOOD BY AUTOMATED COUNT: 12.8 % (ref 11.5–14.5)
ETHANOL SERPL-MCNC: <10 MG/DL
FLUAV RNA SPEC QL NAA+PROBE: NOT DETECTED
FLUBV RNA SPEC QL NAA+PROBE: NOT DETECTED
HCT VFR BLD AUTO: 43.6 % (ref 35–47)
HGB BLD-MCNC: 14.4 G/DL (ref 11.5–16)
IMM GRANULOCYTES # BLD AUTO: 0 K/UL (ref 0–0.04)
IMM GRANULOCYTES NFR BLD AUTO: 0 % (ref 0–0.5)
LIPASE SERPL-CCNC: 76 U/L (ref 73–393)
LYMPHOCYTES # BLD: 6.4 K/UL (ref 0.8–3.5)
LYMPHOCYTES NFR BLD: 58 % (ref 12–49)
MCH RBC QN AUTO: 28.9 PG (ref 26–34)
MCHC RBC AUTO-ENTMCNC: 33 G/DL (ref 30–36.5)
MCV RBC AUTO: 87.4 FL (ref 80–99)
MONOCYTES # BLD: 1.4 K/UL (ref 0–1)
MONOCYTES NFR BLD: 13 % (ref 5–13)
NEUTS SEG # BLD: 2.9 K/UL (ref 1.8–8)
NEUTS SEG NFR BLD: 27 % (ref 32–75)
NRBC # BLD: 0 K/UL (ref 0–0.01)
NRBC BLD-RTO: 0 PER 100 WBC
PLATELET # BLD AUTO: 241 K/UL (ref 150–400)
PMV BLD AUTO: 9.5 FL (ref 8.9–12.9)
RBC # BLD AUTO: 4.99 M/UL (ref 3.8–5.2)
RBC MORPH BLD: ABNORMAL
SARS-COV-2, COV2: NOT DETECTED
WBC # BLD AUTO: 10.9 K/UL (ref 3.6–11)
WBC MORPH BLD: ABNORMAL

## 2022-08-05 PROCEDURE — 65220000001 HC RM PRIVATE PSYCH

## 2022-08-05 PROCEDURE — 74011250637 HC RX REV CODE- 250/637

## 2022-08-05 PROCEDURE — 76705 ECHO EXAM OF ABDOMEN: CPT

## 2022-08-05 RX ORDER — TRAZODONE HYDROCHLORIDE 50 MG/1
50 TABLET ORAL
Status: DISCONTINUED | OUTPATIENT
Start: 2022-08-05 | End: 2022-08-08 | Stop reason: HOSPADM

## 2022-08-05 RX ORDER — DM/P-EPHED/ACETAMINOPH/DOXYLAM 30-7.5/3
2 LIQUID (ML) ORAL
Status: DISCONTINUED | OUTPATIENT
Start: 2022-08-05 | End: 2022-08-08 | Stop reason: HOSPADM

## 2022-08-05 RX ORDER — MIDAZOLAM HYDROCHLORIDE 1 MG/ML
2 INJECTION, SOLUTION INTRAMUSCULAR; INTRAVENOUS
Status: DISCONTINUED | OUTPATIENT
Start: 2022-08-05 | End: 2022-08-08 | Stop reason: HOSPADM

## 2022-08-05 RX ORDER — HYDROXYZINE 25 MG/1
50 TABLET, FILM COATED ORAL
Status: DISCONTINUED | OUTPATIENT
Start: 2022-08-05 | End: 2022-08-08 | Stop reason: HOSPADM

## 2022-08-05 RX ORDER — LORAZEPAM 2 MG/ML
1 INJECTION INTRAMUSCULAR
Status: DISCONTINUED | OUTPATIENT
Start: 2022-08-05 | End: 2022-08-05

## 2022-08-05 RX ORDER — OLANZAPINE 5 MG/1
5 TABLET ORAL
Status: DISCONTINUED | OUTPATIENT
Start: 2022-08-05 | End: 2022-08-08 | Stop reason: HOSPADM

## 2022-08-05 RX ORDER — ACETAMINOPHEN 325 MG/1
650 TABLET ORAL
Status: DISCONTINUED | OUTPATIENT
Start: 2022-08-05 | End: 2022-08-08 | Stop reason: HOSPADM

## 2022-08-05 RX ORDER — DIPHENHYDRAMINE HYDROCHLORIDE 50 MG/ML
50 INJECTION, SOLUTION INTRAMUSCULAR; INTRAVENOUS
Status: DISCONTINUED | OUTPATIENT
Start: 2022-08-05 | End: 2022-08-08 | Stop reason: HOSPADM

## 2022-08-05 RX ORDER — ADHESIVE BANDAGE
30 BANDAGE TOPICAL DAILY PRN
Status: DISCONTINUED | OUTPATIENT
Start: 2022-08-05 | End: 2022-08-08 | Stop reason: HOSPADM

## 2022-08-05 RX ORDER — HALOPERIDOL 5 MG/ML
5 INJECTION INTRAMUSCULAR
Status: DISCONTINUED | OUTPATIENT
Start: 2022-08-05 | End: 2022-08-08 | Stop reason: HOSPADM

## 2022-08-05 RX ORDER — BENZTROPINE MESYLATE 1 MG/1
1 TABLET ORAL
Status: DISCONTINUED | OUTPATIENT
Start: 2022-08-05 | End: 2022-08-08 | Stop reason: HOSPADM

## 2022-08-05 RX ADMIN — Medication 2 MG: at 17:16

## 2022-08-05 NOTE — ED NOTES
Assumed care of patient w HCSO under an ECO. Patient was pulled over by Wilmer and make multiple attempts to get patient safety planned home however she reported she would kill herself when she got home. Patient arrives here and reports her plan to kill herself was to drive her vehicle off a bridge. Carley at bedside at this time evaluating.

## 2022-08-05 NOTE — GROUP NOTE
[place]  GROUP DOCUMENTATION INDIVIDUAL                                                                          Group Therapy Note    Date: 8/5/2022    Group Start Time: 1500  Group End Time: 1600  Group Topic: Recreational/Music Therapy    [facility] [place] [address] ACUTE BEHAV TH    [address] GROUP    Group Therapy Note    Attendees: 8       Attendance: Did not attend        Lauren Trimble
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
## 2022-08-05 NOTE — ED NOTES
Patient currently resting quietly in a position of comfort. 0431-Patient ambulatory out of ED w [facility] - JOJO puga a steady gait.

## 2022-08-05 NOTE — PROGRESS NOTES
[facility] Admission Pharmacy Medication Reconciliation     Information obtained from: patient interview  RxQuery data available1: Yes    Comments/recommendations:  Stopped taking psychiatric medications in February. Per pharmacy benefit data, psychiatric medication history includes: alprazolam, bupropion, escitalopram, guanfacine, lamotrigine, quetiapine, and topiramate. Medication changes (since last review): Added  None  Removed  Epinephrine 0.3 mg/0.3 mL pen  Hydrocortisone 1% topical cream  Ibuprofen  Norgestimate-ethinyl estradiol   Adjusted  None     1RxQuery pharmacy benefit data reflects medications filled and processed through the patient's insurance, however                this data does NOT capture whether the medication was picked up or is currently being taken by the patient. Patient allergies: Allergies as of 08/05/2022 - Fully Reviewed 08/05/2022   Allergen Reaction Noted    Bee sting [sting, bee] Anaphylaxis 09/27/2020    Cat's claw Anaphylaxis 11/30/2021    Poison ivy extract Anaphylaxis 09/27/2020    Quetiapine Other (comments) 08/05/2022         Prior to Admission Medications   Prescriptions Last Dose Informant Patient Reported? Taking?   levonorgestreL (Kyleena) 17.5 mcg/24 hrs (5 yrs) 19.5 mg IUD IUD   Yes No   Sig: Kyleena 17.5 mcg/24 hrs (5yrs) 19.5mg intrauterine device   Take by intrauterine route.       Facility-Administered Medications: None        Thank you,  Rani Salvador, [facility] Specialist, [address] [place] [address]  Desk: [phone] ([id number])  Pharmacy: [phone] ([phone])
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
## 2022-08-05 NOTE — GROUP NOTE
[facility] GROUP DOCUMENTATION INDIVIDUAL                                                                          Group Therapy Note    Date: 8/5/2022    Group Start Time: 0900  Group End Time: 1000  Group Topic: Topic Group    [facility] [address] [address] ACUTE BEHAV [facility]    [place], [address] [facility] GROUP DOCUMENTATION GROUP    Group Therapy Note    Attendees: 10       Attendance: Did not attend

## 2022-08-05 NOTE — PROGRESS NOTES
Problem: Falls - Risk of  Goal: *Absence of Falls  Description: Document Maximus Sol Fall Risk and appropriate interventions in the flowsheet.   Outcome: Progressing Towards Goal  Note: Fall Risk Interventions:                                Problem: Anxiety-Behavioral Health (Adult/Pediatric)  Goal: *STG: Participates in treatment plan  Outcome: Progressing Towards Goal  Goal: *STG: Remains safe in hospital  Outcome: Progressing Towards Goal

## 2022-08-05 NOTE — PROGRESS NOTES
Behavioral Services  Medicare Certification Upon Admission    I certify that this patient's inpatient psychiatric hospital admission is medically necessary for:    [x] (1) Treatment which could reasonably be expected to improve this patient's condition,       [x] (2) Or for diagnostic study;     AND     [x](2) The inpatient psychiatric services are provided while the individual is under the care of a physician and are included in the individualized plan of care.     Estimated length of stay/service 5 - 7 days    Plan for post-hospital care per social history    Electronically signed by Lynn Raza MD on 8/5/2022 at 1:44 PM
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
## 2022-08-05 NOTE — BH NOTES
Pt s alert and oriented x 4. She has spent mos of the day in her room. She denies SI/HI, AVH, depression and anxiety at this time. There are no noted issues with pain or discomfort. Pt is noted to be intrusive, and disregards boundaries during conversation. Redirection is required. There are no other issue to note at this time. Monitoring will continue for changes.

## 2022-08-06 PROBLEM — F43.21 ADJUSTMENT DISORDER WITH DEPRESSED MOOD: Status: ACTIVE | Noted: 2022-08-06

## 2022-08-06 PROBLEM — F60.3 BORDERLINE PERSONALITY DISORDER (HCC): Status: ACTIVE | Noted: 2022-08-06

## 2022-08-06 PROCEDURE — 74011250637 HC RX REV CODE- 250/637

## 2022-08-06 PROCEDURE — 99232 SBSQ HOSP IP/OBS MODERATE 35: CPT | Performed by: PSYCHIATRY & NEUROLOGY

## 2022-08-06 PROCEDURE — 65220000001 HC RM PRIVATE PSYCH

## 2022-08-06 RX ORDER — IBUPROFEN 400 MG/1
400 TABLET ORAL
Status: DISCONTINUED | OUTPATIENT
Start: 2022-08-06 | End: 2022-08-08 | Stop reason: HOSPADM

## 2022-08-06 RX ORDER — NITROFURANTOIN 25; 75 MG/1; MG/1
100 CAPSULE ORAL 2 TIMES DAILY WITH MEALS
Status: DISCONTINUED | OUTPATIENT
Start: 2022-08-06 | End: 2022-08-08 | Stop reason: HOSPADM

## 2022-08-06 RX ADMIN — HYDROXYZINE HYDROCHLORIDE 50 MG: 25 TABLET ORAL at 21:17

## 2022-08-06 RX ADMIN — ACETAMINOPHEN 650 MG: 325 TABLET ORAL at 20:03

## 2022-08-06 RX ADMIN — TRAZODONE HYDROCHLORIDE 50 MG: 50 TABLET ORAL at 21:17

## 2022-08-06 RX ADMIN — ACETAMINOPHEN 650 MG: 325 TABLET ORAL at 11:09

## 2022-08-06 NOTE — PROGRESS NOTES
Alexxiene Cousin reported urinary frequency, urgency, abd  pain, lower back pain, dark urine, and odor when urinating. She reported that she has had these symptoms x1 wk. VS obtained- T 36.7, P 76, /61, R 18, o2 sat 99%On-call provider contacted. Order placed for hospital consult per DUDLEY Tariq NP. Pt appeared to have slept approx 5 hours during the night. Monitoring for safety and behaviors continues.

## 2022-08-06 NOTE — PROGRESS NOTES
Problem: Anxiety-Behavioral Health (Adult/Pediatric)  Goal: *STG: Participates in treatment plan  Outcome: Progressing Towards Goal  Goal: *STG: Remains safe in hospital  Outcome: Progressing Towards Goal  Goal: *STG: Seeks staff when feelings of anxiety and fear arise  Outcome: Progressing Towards Goal     Shift change report given to Jihan OVERTON (oncoming nurse) by Cynthia Ferris (offgoing nurse). Report included the following information SBAR, Kardex, MAR, and Recent Results. Assumed care of patient. Met patient in hallway. Calm and cooperative during assessment. Patient denied anxiety, depression, SI, HI and AVH. Patient c/o headache, does not want PRN pain medications. Patient meal compliant. PRN Tylenol given at 1109 for headache.

## 2022-08-07 LAB
APPEARANCE UR: ABNORMAL
BACTERIA URNS QL MICRO: NEGATIVE /HPF
BILIRUB UR QL: NEGATIVE
COLOR UR: ABNORMAL
EPITH CASTS URNS QL MICRO: ABNORMAL /LPF
GLUCOSE UR STRIP.AUTO-MCNC: NEGATIVE MG/DL
HGB UR QL STRIP: NEGATIVE
KETONES UR QL STRIP.AUTO: NEGATIVE MG/DL
LEUKOCYTE ESTERASE UR QL STRIP.AUTO: ABNORMAL
NITRITE UR QL STRIP.AUTO: NEGATIVE
PH UR STRIP: 8 [PH] (ref 5–8)
PROT UR STRIP-MCNC: ABNORMAL MG/DL
RBC #/AREA URNS HPF: ABNORMAL /HPF (ref 0–5)
SP GR UR REFRACTOMETRY: 1.02
UA: UC IF INDICATED,UAUC: ABNORMAL
UROBILINOGEN UR QL STRIP.AUTO: 1 EU/DL (ref 0.2–1)
WBC URNS QL MICRO: ABNORMAL /HPF (ref 0–4)

## 2022-08-07 PROCEDURE — 74011250637 HC RX REV CODE- 250/637

## 2022-08-07 PROCEDURE — 81001 URINALYSIS AUTO W/SCOPE: CPT

## 2022-08-07 PROCEDURE — 99231 SBSQ HOSP IP/OBS SF/LOW 25: CPT | Performed by: PSYCHIATRY & NEUROLOGY

## 2022-08-07 PROCEDURE — 74011250637 HC RX REV CODE- 250/637: Performed by: PSYCHIATRY & NEUROLOGY

## 2022-08-07 PROCEDURE — 65220000001 HC RM PRIVATE PSYCH

## 2022-08-07 PROCEDURE — 87077 CULTURE AEROBIC IDENTIFY: CPT

## 2022-08-07 PROCEDURE — 87086 URINE CULTURE/COLONY COUNT: CPT

## 2022-08-07 PROCEDURE — 87186 SC STD MICRODIL/AGAR DIL: CPT

## 2022-08-07 RX ORDER — PANTOPRAZOLE SODIUM 40 MG/1
40 TABLET, DELAYED RELEASE ORAL
Status: DISCONTINUED | OUTPATIENT
Start: 2022-08-07 | End: 2022-08-07

## 2022-08-07 RX ORDER — CALCIUM CARBONATE 200(500)MG
200 TABLET,CHEWABLE ORAL
Status: DISCONTINUED | OUTPATIENT
Start: 2022-08-07 | End: 2022-08-08 | Stop reason: HOSPADM

## 2022-08-07 RX ADMIN — IBUPROFEN 400 MG: 400 TABLET, FILM COATED ORAL at 17:37

## 2022-08-07 RX ADMIN — NITROFURANTOIN MONOHYDRATE/MACROCRYSTALLINE 100 MG: 25; 75 CAPSULE ORAL at 08:38

## 2022-08-07 RX ADMIN — HYDROXYZINE HYDROCHLORIDE 50 MG: 25 TABLET ORAL at 22:56

## 2022-08-07 RX ADMIN — TRAZODONE HYDROCHLORIDE 50 MG: 50 TABLET ORAL at 22:56

## 2022-08-07 RX ADMIN — ACETAMINOPHEN 650 MG: 325 TABLET ORAL at 16:51

## 2022-08-07 RX ADMIN — NITROFURANTOIN MONOHYDRATE/MACROCRYSTALLINE 100 MG: 25; 75 CAPSULE ORAL at 16:51

## 2022-08-07 RX ADMIN — CALCIUM CARBONATE (ANTACID) CHEW TAB 500 MG 200 MG: 500 CHEW TAB at 22:56

## 2022-08-07 RX ADMIN — ACETAMINOPHEN 650 MG: 325 TABLET ORAL at 08:39

## 2022-08-07 RX ADMIN — CALCIUM CARBONATE (ANTACID) CHEW TAB 500 MG 200 MG: 500 CHEW TAB at 10:27
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
## 2022-08-07 NOTE — PROGRESS NOTES
Misty Steinberg was noted in the dayroom watching tv and interacting with his peers. She denied SI/HI/AVH. She received Tylenol for headache pain 5/10. Pt appeared to have slept approx 8 hours during the night. Urine sample was collected for lab. Monitoring for safety and behaviors continues.

## 2022-08-07 NOTE — PROGRESS NOTES
0730 Change of Shift report received by Carmina Reaves (oncoming nurse) from [employer] (off going nurse) including SBAR, KARDEX, MAR, and recent results. 0820 patient met in room, appears to be asleep and free of distress. Patient aroused easily and cooperative with assessment. Patient appears distracted, denies depression, anxiety, AVH, SI and HI. Patient reports fatigue and headache pain 7/10 since previous evening. Patient discharge focused and expressed excitement about expected discharge on 8/8/2022.      0900 patient meal and medication compliant. Patient observed ambulating in [place] with a steady gait prior to returning to room. 1300 patient remains isolated in room with complaint of unspecified abdominal pain with increased pressing on abdomen. Patient advised to avoid aggravating activities. 1400 Patient observed to be resting in bed appearing to be asleep. Patient remains isolative and withdrawn. 1700 patient observed ambulating in hallway. Patient reports continued bilateral flank pain that worsens with pressure. Patient reported only eating the \"dessert\" portion of her evening meal.  Writer advised patient to eat small, bland snacks to avoid stomach irritation from medications. Patient pleasant and responsive to redirection. Patient expressed \"just wanting to go to sleep\" and \"sleep all of the time\". Problem: Discharge Planning  Goal: *Discharge to safe environment  Outcome: Progressing Towards Goal     Problem: Falls - Risk of  Goal: *Absence of Falls  Description: Document Bard  Fall Risk and appropriate interventions in the flowsheet.   Outcome: Progressing Towards Goal  Note: Fall Risk Interventions:            Medication Interventions: Teach patient to arise slowly                   Problem: Risk of Harm to Self or Others  Goal: Patient/Family Education  Outcome: Progressing Towards Goal     Problem: Anxiety-Behavioral Health (Adult/Pediatric)  Goal: *STG: Remains safe in hospital  Outcome: Progressing Towards Goal

## 2022-08-08 VITALS
SYSTOLIC BLOOD PRESSURE: 99 MMHG | HEART RATE: 76 BPM | RESPIRATION RATE: 18 BRPM | DIASTOLIC BLOOD PRESSURE: 52 MMHG | TEMPERATURE: 98 F | OXYGEN SATURATION: 99 %

## 2022-08-08 PROCEDURE — 74011250637 HC RX REV CODE- 250/637: Performed by: PSYCHIATRY & NEUROLOGY

## 2022-08-08 PROCEDURE — 74011250637 HC RX REV CODE- 250/637

## 2022-08-08 RX ORDER — TRAZODONE HYDROCHLORIDE 50 MG/1
50 TABLET ORAL
Qty: 30 TABLET | Refills: 0 | Status: SHIPPED | OUTPATIENT
Start: 2022-08-08

## 2022-08-08 RX ORDER — HYDROXYZINE 50 MG/1
50 TABLET, FILM COATED ORAL
Qty: 30 TABLET | Refills: 0 | Status: SHIPPED | OUTPATIENT
Start: 2022-08-08 | End: 2022-08-18

## 2022-08-08 RX ORDER — NITROFURANTOIN 25; 75 MG/1; MG/1
100 CAPSULE ORAL 2 TIMES DAILY WITH MEALS
Qty: 7 CAPSULE | Refills: 0 | Status: SHIPPED | OUTPATIENT
Start: 2022-08-08 | End: 2022-08-12

## 2022-08-08 RX ADMIN — CALCIUM CARBONATE (ANTACID) CHEW TAB 500 MG 200 MG: 500 CHEW TAB at 08:10

## 2022-08-08 RX ADMIN — IBUPROFEN 400 MG: 400 TABLET, FILM COATED ORAL at 09:42

## 2022-08-08 RX ADMIN — NITROFURANTOIN MONOHYDRATE/MACROCRYSTALLINE 100 MG: 25; 75 CAPSULE ORAL at 08:10

## 2022-08-08 RX ADMIN — HYDROXYZINE HYDROCHLORIDE 50 MG: 25 TABLET ORAL at 11:25

## 2022-08-08 RX ADMIN — CALCIUM CARBONATE (ANTACID) CHEW TAB 500 MG 200 MG: 500 CHEW TAB at 14:15

## 2022-08-08 NOTE — BH NOTES
Patient alert and verbal. Discharged home to continue recommended plan of care. Discharge instructions reviewed with patient. Patients belongings and valuables returned. Patient transported home via family.

## 2022-08-08 NOTE — PROGRESS NOTES
Spiritual Care Assessment/Progress Note  [facility]      NAME: Bandar Carl      MRN: [id number]  AGE: 22 y.o. SEX: female  Alevism Affiliation: Misty Caban   Language: English     8/8/2022     Total Time (in minutes): 5     Spiritual Assessment begun in [place] [address] [address] [place] through conversation with:         []Patient        [] Family    [] Friend(s)        Reason for Consult: Initial/Spiritual assessment, patient floor     Spiritual beliefs: (Please include comment if needed)     [] Identifies with a cr tradition:         [] Supported by a cr community:            [] Claims no spiritual orientation:           [] Seeking spiritual identity:                [] Adheres to an individual form of spirituality:           [x] Not able to assess:                           Identified resources for coping:      [] Prayer                               [] Music                  [] Guided Imagery     [] Family/friends                 [] Pet visits     [] Devotional reading                         [x] Unknown     [] Other:                                               Interventions offered during this visit: (See comments for more details)                Plan of Care:     [] Support spiritual and/or cultural needs    [] Support AMD and/or advance care planning process      [] Support grieving process   [] Coordinate Rites and/or Rituals    [] Coordination with community clergy   [] No spiritual needs identified at this time   [] Detailed Plan of Care below (See Comments)  [] Make referral to Music Therapy  [] Make referral to Pet Therapy     [] Make referral to Addiction services  [] Make referral to [facility]  [] Make referral to Spiritual Care Partner  [] No future visits requested        [x] Contact Spiritual Care for further referrals     Comments:  visit for initial spiritual assessment.   Patient out of room at the time of this visit possibly attending a group activity or private meeting with providers. Please contact spiritual care for further referral or consult. Rev.  Rosalio Sandhu MDiv, [facility], [facility]   paging service: [phone])
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
## 2022-08-08 NOTE — PROGRESS NOTES
Tish Herring was noted resting quietly in bed. She denied SI/HI/AVH. She received Tums for c/o indigestion, Atarax for anxiety, and Trazodone for sleep. Pt appeared to have slept approx 6 hours during the night. Monitoring for safety and behaviors continues.

## 2022-08-08 NOTE — BH NOTES
DISCHARGE SUMMARY     The patient Jerome French exhibits the ability to control behavior in a less restrictive environment. Patient's level of functioning is improving. No assaultive/destructive behavior has been observed for the past 24 hours. No suicidal/homicidal threat or behavior has been observed for the past 24 hours. There is no evidence of serious medication side effects. Patient has not been in physical or protective restraints for at least the past 24 hours. If weapons involved, how are they secured? No weapons identified      Is patient aware of and in agreement with discharge plan? Yes       Arrangements for medication:  Prescriptions given to patient, given a weeks supply or 30 day supply. Copy of discharge instructions to provider?:  Yes       Arrangements for transportation home:  Patient's mother will pick patient up at time of discharge          Keep all follow up appointments as scheduled, continue to take prescribed medications per physician instructions.     Mental health crisis number:  007 or your local mental health crisis line number at [phone]       [facility] [address] Emergency WARM LINE        [phone] ([phone])        M-F: 9am to 9pm        Sat & Sun: 5pm - 9pm    National suicide prevention lines:                              8-894-TBROOTU (5-678.623.2966)        6-913-363-TALK ([phone])    24/7 Crisis Text Line:  Text HOME to [address] [place] [address],[address], [facility]
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
## 2022-08-08 NOTE — DISCHARGE INSTRUCTIONS
If I feel I am at risk of hurting myself or others, I will call the crisis office and speak with a crisis worker who will assist me during my crisis.    [address]  [phone]  [facility] [place] [address] [phone]  [address] [facility]  [place] [address] Crisis-  [phone]
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
## 2022-08-08 NOTE — GROUP NOTE
[place]  GROUP DOCUMENTATION INDIVIDUAL                                                                          Group Therapy Note    Date: 8/8/2022    Group Start Time: 0900  Group End Time: 1000  Group Topic: Topic Group    [facility] [place] [address] ACUTE BEHAV [facility]    [place], [address] [facility] GROUP DOCUMENTATION GROUP    Group Therapy Note    Attendees: 11       Attendance: Attended    Patient's Goal:  To participate in chair exercise routine    Interventions/techniques: Supported-benefits of exercise    Follows Directions:  Followed directions    Interactions: Interacted appropriately    Mental Status: Calm and Flat    Behavior/appearance: Attentive, Cooperative, and Needed prompting    Goals Achieved: Able to engage in interactions, Able to listen to others, and Discussed coping      Kaylah [place]

## 2022-08-08 NOTE — PROGRESS NOTES
Problem: Anxiety-Behavioral Health (Adult/Pediatric)  Goal: *STG: Participates in treatment plan  Outcome: Progressing Towards Goal  Goal: *STG: Remains safe in hospital  Outcome: Progressing Towards Goal  Goal: *STG: Seeks staff when feelings of anxiety and fear arise  Outcome: Progressing Towards Goal     Shift change report given to Jihan OVERTON (oncoming nurse) by Ai Stewart (offgoing nurse). Report included the following information SBAR, Kardex, MAR, and Recent Results. Assumed care of patient. Met patient in [facility]. Patient discharge focused upon assessment, requesting to talk to MD. Patient denied anxiety, depression, SI, HI, AVH and pain. Medication and meal compliant. PRN TUMS given at 0810. Patient inpatient, wanting to speak to MD so she can be discharged. Patient then crying when writer stated that MD was seeing other patients. PRN Atarax given at 1125. Discharge instructions reviewed with patient.
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
## 2022-08-09 LAB
BACTERIA SPEC CULT: ABNORMAL
CC UR VC: ABNORMAL
SERVICE CMNT-IMP: ABNORMAL

## 2022-09-28 ENCOUNTER — APPOINTMENT (OUTPATIENT)
Dept: GENERAL RADIOLOGY | Age: 22
End: 2022-09-28
Attending: EMERGENCY MEDICINE
Payer: MEDICAID

## 2022-09-28 ENCOUNTER — HOSPITAL ENCOUNTER (EMERGENCY)
Age: 22
Discharge: HOME | End: 2022-09-28
Attending: EMERGENCY MEDICINE | Admitting: EMERGENCY MEDICINE
Payer: MEDICAID

## 2022-09-28 VITALS
OXYGEN SATURATION: 99 % | TEMPERATURE: 98 F | RESPIRATION RATE: 18 BRPM | DIASTOLIC BLOOD PRESSURE: 62 MMHG | BODY MASS INDEX: 27.97 KG/M2 | HEIGHT: 61 IN | SYSTOLIC BLOOD PRESSURE: 118 MMHG | WEIGHT: 148.15 LBS | HEART RATE: 98 BPM

## 2022-09-28 DIAGNOSIS — R04.2 COUGH WITH HEMOPTYSIS: ICD-10-CM

## 2022-09-28 DIAGNOSIS — J20.9 ACUTE BRONCHITIS, UNSPECIFIED ORGANISM: Primary | ICD-10-CM

## 2022-09-28 DIAGNOSIS — Z72.89 CURRENT EVERY DAY VAPING: ICD-10-CM

## 2022-09-28 DIAGNOSIS — F12.90 MARIJUANA USE: ICD-10-CM

## 2022-09-28 DIAGNOSIS — R11.2 NAUSEA AND VOMITING, UNSPECIFIED VOMITING TYPE: ICD-10-CM

## 2022-09-28 LAB
COVID-19 RAPID TEST, COVR: NOT DETECTED
DEPRECATED S PYO AG THROAT QL EIA: NEGATIVE
FLUAV AG NPH QL IA: NEGATIVE
FLUBV AG NOSE QL IA: NEGATIVE
SOURCE, COVRS: NORMAL

## 2022-09-28 PROCEDURE — 87804 INFLUENZA ASSAY W/OPTIC: CPT

## 2022-09-28 PROCEDURE — 71046 X-RAY EXAM CHEST 2 VIEWS: CPT

## 2022-09-28 PROCEDURE — 87880 STREP A ASSAY W/OPTIC: CPT

## 2022-09-28 PROCEDURE — 87070 CULTURE OTHR SPECIMN AEROBIC: CPT

## 2022-09-28 PROCEDURE — 87635 SARS-COV-2 COVID-19 AMP PRB: CPT

## 2022-09-28 PROCEDURE — 99284 EMERGENCY DEPT VISIT MOD MDM: CPT | Performed by: EMERGENCY MEDICINE

## 2022-09-28 PROCEDURE — 87147 CULTURE TYPE IMMUNOLOGIC: CPT

## 2022-09-28 PROCEDURE — 99284 EMERGENCY DEPT VISIT MOD MDM: CPT

## 2022-09-28 PROCEDURE — 74011250637 HC RX REV CODE- 250/637: Performed by: EMERGENCY MEDICINE

## 2022-09-28 RX ORDER — ALBUTEROL SULFATE 90 UG/1
2 AEROSOL, METERED RESPIRATORY (INHALATION)
Qty: 1 EACH | Refills: 0 | Status: SHIPPED | OUTPATIENT
Start: 2022-09-28

## 2022-09-28 RX ORDER — ALBUTEROL SULFATE 90 UG/1
1 AEROSOL, METERED RESPIRATORY (INHALATION)
Status: COMPLETED | OUTPATIENT
Start: 2022-09-28 | End: 2022-09-28

## 2022-09-28 RX ORDER — IBUPROFEN 600 MG/1
600 TABLET ORAL
Status: COMPLETED | OUTPATIENT
Start: 2022-09-28 | End: 2022-09-28

## 2022-09-28 RX ORDER — ONDANSETRON 4 MG/1
4 TABLET, ORALLY DISINTEGRATING ORAL
Status: COMPLETED | OUTPATIENT
Start: 2022-09-28 | End: 2022-09-28

## 2022-09-28 RX ORDER — ONDANSETRON 4 MG/1
4 TABLET, FILM COATED ORAL
Qty: 20 TABLET | Refills: 0 | Status: SHIPPED | OUTPATIENT
Start: 2022-09-28

## 2022-09-28 RX ADMIN — ONDANSETRON 4 MG: 4 TABLET, ORALLY DISINTEGRATING ORAL at 06:11

## 2022-09-28 RX ADMIN — ALBUTEROL SULFATE 1 PUFF: 90 AEROSOL, METERED RESPIRATORY (INHALATION) at 06:11

## 2022-09-28 RX ADMIN — IBUPROFEN 600 MG: 600 TABLET ORAL at 06:11

## 2022-09-28 NOTE — ED PROVIDER NOTES
EMERGENCY DEPARTMENT HISTORY AND PHYSICAL EXAM      Date: 9/28/2022  Patient Name: Taty Whitaker    History of Presenting Illness     Chief Complaint   Patient presents with    Generalized Body Aches     Patient ambulatory to triage c/o being sick x the past few days. Reports one episode of coughing up blood. Reports generalized body aches and headache. History Provided By: Patient    HPI: Taty Whitaker, 25 y.o. female with PMHx significant for Anxiety, depression, ADHD, borderline personality disorder, autism, exercise-induced asthma presents to the ED with 3 days of headache, body aches, sore throat, cough, runny nose. She also reports that tonight she coughed up a \"chunk of blood\". She reports chest soreness with coughing. She reports nausea and vomiting as well and states she has vomited 7 or 8 times since onset of symptoms. Denies any diarrhea. Denies dysuria, hematuria, urinary frequency. States she has not had any COVID-vaccine. She admits to smoking marijuana and vaping. PCP: None    No current facility-administered medications on file prior to encounter. Current Outpatient Medications on File Prior to Encounter   Medication Sig Dispense Refill    traZODone (DESYREL) 50 mg tablet Take 1 Tablet by mouth nightly as needed for Sleep (For insomnia). Indications: insomnia associated with depression 30 Tablet 0    levonorgestreL (Kyleena) 17.5 mcg/24 hrs (5 yrs) 19.5 mg IUD IUD Kyleena 17.5 mcg/24 hrs (5yrs) 19.5mg intrauterine device   Take by intrauterine route.          Past History     Past Medical History:  Past Medical History:   Diagnosis Date    ADHD (attention deficit hyperactivity disorder)     Asthma     exercise induced    Bipolar affective ([address]     Borderline personality disorder ([facility]     GERD (gastroesophageal reflux disease)     Nicotine vapor product user     Psychiatric disorder     anxiety, depression, SI's, bipolar depression    Suicidal thoughts        Past Surgical History:  Past Surgical History:   Procedure Laterality Date    HX CHOLECYSTECTOMY      HX HEENT      foreign body removal from ear with ketamine       Family History:  No family history on file. Social History:  Social History     Tobacco Use    Smoking status: Never    Smokeless tobacco: Current    Tobacco comments:     Vaping   Substance Use Topics    Alcohol use: Not Currently     Comment: maybe three times a year    Drug use: No       Allergies: Allergies   Allergen Reactions    Bee Sting [Sting, Bee] Anaphylaxis    Cat's Claw Anaphylaxis    Poison Ivy Extract Anaphylaxis    Quetiapine Other (comments)     Causes intense suicidal thoughts         Review of Systems   Review of Systems   Constitutional:  Positive for fatigue. Negative for chills and fever. HENT:  Positive for congestion, rhinorrhea and sore throat. Respiratory:  Positive for cough and chest tightness. Cardiovascular:  Positive for chest pain. Gastrointestinal:  Positive for nausea and vomiting. Negative for abdominal pain. Genitourinary: Negative. Musculoskeletal:  Positive for myalgias. Skin: Negative. Neurological:  Positive for headaches. Negative for syncope and numbness. Psychiatric/Behavioral:  The patient is nervous/anxious. All other systems reviewed and are negative.       Physical Exam   General appearance -well nourished, well appearing, and in no distress  Eyes - pupils equal and reactive, extraocular eye movements intact  ENT - mucous membranes moist, pharynx mildly erythematous with tonsillar hypertrophy but no exudate  Neck - supple, no significant adenopathy; non-tender to palpation  Chest - clear to auscultation, no wheezes, rales or rhonchi; non-tender to palpation  Heart - normal rate and regular rhythm, S1 and S2 normal, no murmurs noted  Abdomen - soft, nontender, nondistended, no masses or organomegaly  Musculoskeletal - no joint tenderness, deformity or swelling; normal ROM  Extremities - peripheral pulses normal, no pedal edema  Skin - normal coloration and turgor, no rashes  Neurological - alert, oriented x3, normal speech, no focal findings or movement disorder noted    Diagnostic Study Results     Labs -     Recent Results (from the past 72 hour(s))   INFLUENZA A+B VIRAL AGS    Collection Time: 09/28/22  4:15 AM   Result Value Ref Range    Influenza A Antigen Negative NEG      Influenza B Antigen Negative NEG     COVID-19 RAPID TEST    Collection Time: 09/28/22  4:15 AM   Result Value Ref Range    Specimen source Nasopharyngeal      COVID-19 rapid test Not detected NOTD     STREP AG SCREEN, GROUP A    Collection Time: 09/28/22  6:11 AM    Specimen: Swab   Result Value Ref Range    Group A Strep Ag ID Negative NEG     CULTURE, THROAT    Collection Time: 09/28/22  6:11 AM    Specimen: Throat   Result Value Ref Range    Special Requests: NO SPECIAL REQUESTS      Culture result: BETA STREPTOCOCCUS, GROUP TYPING TO FOLLOW           Radiologic Studies -   XR CHEST PA LAT   Final Result      No acute process. CT Results  (Last 48 hours)      None          CXR Results  (Last 48 hours)                 09/28/22 0435  XR CHEST PA LAT Final result    Impression:      No acute process. Narrative:  EXAM:  XR CHEST PA LAT       INDICATION: Cough       COMPARISON: 3/19/2021       TECHNIQUE: PA and lateral chest views       FINDINGS: The cardiomediastinal and hilar contours are within normal limits. The   pulmonary vasculature is within normal limits. The lungs and pleural spaces are clear. There is no pneumothorax. The visualized   bones and upper abdomen are age-appropriate. Medical Decision Making   I am the first provider for this patient. I reviewed the vital signs, available nursing notes, past medical history, past surgical history, family history and social history. Vital Signs-Reviewed the patient's vital signs.   Patient Vitals for the past 12 hrs: Temp Pulse Resp BP SpO2   09/28/22 0411 98.3 °F (36.8 °C) (!) 106 20 112/67 97 %           Records Reviewed: Nursing Notes and Old Medical Records    Provider Notes (Medical Decision Making):   Patient presents with runny nose, sore throat, cough. Reports 1 episode of coughing up blood this evening. Denies any fevers or chills. Differential diagnosis includes bronchitis, pneumonia, COVID, flu, strep throat. Will check COVID, flu, strep swabs. ED Course:   Initial assessment performed. The patients presenting problems have been discussed, and they are in agreement with the care plan formulated and outlined with them. I have encouraged them to ask questions as they arise throughout their visit. Progress Notes:  Chest x-ray is clear. COVID, flu, strep test are negative. Will treat with albuterol and Zofran. Follow-up with PCP and return to ED for worsening symptoms. Disposition:  Discharge home    PLAN:  1. Discharge Medication List as of 9/28/2022  7:00 AM        START taking these medications    Details   ondansetron hcl (Zofran) 4 mg tablet Take 1 Tablet by mouth every eight (8) hours as needed for Nausea or Vomiting., Normal, Disp-20 Tablet, R-0      albuterol (PROVENTIL HFA, VENTOLIN HFA, PROAIR HFA) 90 mcg/actuation inhaler Take 2 Puffs by inhalation every four (4) hours as needed for Wheezing., Normal, Disp-1 Each, R-0           CONTINUE these medications which have NOT CHANGED    Details   traZODone (DESYREL) 50 mg tablet Take 1 Tablet by mouth nightly as needed for Sleep (For insomnia). Indications: insomnia associated with depression, Normal, Disp-30 Tablet, R-0      levonorgestreL (Kyleena) 17.5 mcg/24 hrs (5 yrs) 19.5 mg IUD IUD Kyleena 17.5 mcg/24 hrs (5yrs) 19.5mg intrauterine device   Take by intrauterine route., Historical Med           2.    Follow-up Information       Follow up With Specialties Details Why Contact Info    [facility] EMERGENCY DEPT Emergency Medicine  If symptoms worsen [address] [facility]  [phone]    Frantz Zaragoza NP Nurse Practitioner Schedule an appointment as soon as possible for a visit   [address] [place] [address] Kathleen Swenson  [phone]            Return to ED if worse     Diagnosis     Clinical Impression:   1. Acute bronchitis, unspecified organism    2. Cough with hemoptysis    3. Nausea and vomiting, unspecified vomiting type    4. Current every day vaping    5.  Marijuana use

## 2022-09-30 ENCOUNTER — HOSPITAL ENCOUNTER (EMERGENCY)
Age: 22
Discharge: HOME | End: 2022-09-30
Attending: EMERGENCY MEDICINE
Payer: MEDICAID

## 2022-09-30 ENCOUNTER — APPOINTMENT (OUTPATIENT)
Dept: ULTRASOUND IMAGING | Age: 22
End: 2022-09-30
Attending: EMERGENCY MEDICINE
Payer: MEDICAID

## 2022-09-30 VITALS
TEMPERATURE: 98.8 F | WEIGHT: 146.39 LBS | RESPIRATION RATE: 24 BRPM | HEART RATE: 76 BPM | SYSTOLIC BLOOD PRESSURE: 104 MMHG | HEIGHT: 62 IN | DIASTOLIC BLOOD PRESSURE: 60 MMHG | BODY MASS INDEX: 26.94 KG/M2 | OXYGEN SATURATION: 98 %

## 2022-09-30 DIAGNOSIS — M94.0 COSTOCHONDRITIS: ICD-10-CM

## 2022-09-30 DIAGNOSIS — R07.81 RIB PAIN ON RIGHT SIDE: Primary | ICD-10-CM

## 2022-09-30 DIAGNOSIS — J06.9 ACUTE URI: ICD-10-CM

## 2022-09-30 LAB
ALBUMIN SERPL-MCNC: 3.7 G/DL (ref 3.5–5)
ALBUMIN/GLOB SERPL: 0.8 {RATIO} (ref 1.1–2.2)
ALP SERPL-CCNC: 127 U/L (ref 45–117)
ALT SERPL-CCNC: 51 U/L (ref 12–78)
ANION GAP SERPL CALC-SCNC: 6 MMOL/L (ref 5–15)
AST SERPL-CCNC: 26 U/L (ref 15–37)
BACTERIA SPEC CULT: ABNORMAL
BACTERIA SPEC CULT: ABNORMAL
BASOPHILS # BLD: 0.1 K/UL (ref 0–0.1)
BASOPHILS NFR BLD: 1 % (ref 0–1)
BILIRUB SERPL-MCNC: 0.4 MG/DL (ref 0.2–1)
BUN SERPL-MCNC: 9 MG/DL (ref 6–20)
BUN/CREAT SERPL: 15 (ref 12–20)
CALCIUM SERPL-MCNC: 9.3 MG/DL (ref 8.5–10.1)
CHLORIDE SERPL-SCNC: 102 MMOL/L (ref 97–108)
CO2 SERPL-SCNC: 29 MMOL/L (ref 21–32)
COVID-19 RAPID TEST, COVR: NOT DETECTED
CREAT SERPL-MCNC: 0.62 MG/DL (ref 0.55–1.02)
D DIMER PPP FEU-MCNC: 0.23 MG/L FEU (ref 0–0.65)
DEPRECATED S PYO AG THROAT QL EIA: NEGATIVE
DIFFERENTIAL METHOD BLD: ABNORMAL
EOSINOPHIL # BLD: 0.4 K/UL (ref 0–0.4)
EOSINOPHIL NFR BLD: 4 % (ref 0–7)
ERYTHROCYTE [DISTWIDTH] IN BLOOD BY AUTOMATED COUNT: 12.8 % (ref 11.5–14.5)
FLUAV AG NPH QL IA: NEGATIVE
FLUBV AG NOSE QL IA: NEGATIVE
GLOBULIN SER CALC-MCNC: 4.4 G/DL (ref 2–4)
GLUCOSE SERPL-MCNC: 90 MG/DL (ref 65–100)
HCT VFR BLD AUTO: 38.8 % (ref 35–47)
HGB BLD-MCNC: 13.2 G/DL (ref 11.5–16)
IMM GRANULOCYTES # BLD AUTO: 0 K/UL (ref 0–0.04)
IMM GRANULOCYTES NFR BLD AUTO: 0 % (ref 0–0.5)
LIPASE SERPL-CCNC: 94 U/L (ref 73–393)
LYMPHOCYTES # BLD: 4.1 K/UL (ref 0.8–3.5)
LYMPHOCYTES NFR BLD: 38 % (ref 12–49)
MCH RBC QN AUTO: 29.6 PG (ref 26–34)
MCHC RBC AUTO-ENTMCNC: 34 G/DL (ref 30–36.5)
MCV RBC AUTO: 87 FL (ref 80–99)
MONOCYTES # BLD: 1 K/UL (ref 0–1)
MONOCYTES NFR BLD: 10 % (ref 5–13)
NEUTS SEG # BLD: 5.2 K/UL (ref 1.8–8)
NEUTS SEG NFR BLD: 47 % (ref 32–75)
NRBC # BLD: 0 K/UL (ref 0–0.01)
NRBC BLD-RTO: 0 PER 100 WBC
PLATELET # BLD AUTO: 306 K/UL (ref 150–400)
PMV BLD AUTO: 8.9 FL (ref 8.9–12.9)
POTASSIUM SERPL-SCNC: 4 MMOL/L (ref 3.5–5.1)
PROT SERPL-MCNC: 8.1 G/DL (ref 6.4–8.2)
RBC # BLD AUTO: 4.46 M/UL (ref 3.8–5.2)
SERVICE CMNT-IMP: ABNORMAL
SODIUM SERPL-SCNC: 137 MMOL/L (ref 136–145)
SOURCE, COVRS: NORMAL
WBC # BLD AUTO: 10.8 K/UL (ref 3.6–11)

## 2022-09-30 PROCEDURE — 87635 SARS-COV-2 COVID-19 AMP PRB: CPT

## 2022-09-30 PROCEDURE — 87804 INFLUENZA ASSAY W/OPTIC: CPT

## 2022-09-30 PROCEDURE — 36415 COLL VENOUS BLD VENIPUNCTURE: CPT

## 2022-09-30 PROCEDURE — 74011250636 HC RX REV CODE- 250/636: Performed by: EMERGENCY MEDICINE

## 2022-09-30 PROCEDURE — 83690 ASSAY OF LIPASE: CPT

## 2022-09-30 PROCEDURE — 87147 CULTURE TYPE IMMUNOLOGIC: CPT

## 2022-09-30 PROCEDURE — 87070 CULTURE OTHR SPECIMN AEROBIC: CPT

## 2022-09-30 PROCEDURE — 85379 FIBRIN DEGRADATION QUANT: CPT

## 2022-09-30 PROCEDURE — 74011000250 HC RX REV CODE- 250: Performed by: EMERGENCY MEDICINE

## 2022-09-30 PROCEDURE — 76705 ECHO EXAM OF ABDOMEN: CPT

## 2022-09-30 PROCEDURE — 85025 COMPLETE CBC W/AUTO DIFF WBC: CPT

## 2022-09-30 PROCEDURE — 99284 EMERGENCY DEPT VISIT MOD MDM: CPT

## 2022-09-30 PROCEDURE — 96374 THER/PROPH/DIAG INJ IV PUSH: CPT

## 2022-09-30 PROCEDURE — 87880 STREP A ASSAY W/OPTIC: CPT

## 2022-09-30 PROCEDURE — 80053 COMPREHEN METABOLIC PANEL: CPT

## 2022-09-30 RX ORDER — SODIUM CHLORIDE 9 MG/ML
500 INJECTION, SOLUTION INTRAVENOUS ONCE
Status: COMPLETED | OUTPATIENT
Start: 2022-09-30 | End: 2022-09-30

## 2022-09-30 RX ORDER — KETOROLAC TROMETHAMINE 30 MG/ML
15 INJECTION, SOLUTION INTRAMUSCULAR; INTRAVENOUS
Status: COMPLETED | OUTPATIENT
Start: 2022-09-30 | End: 2022-09-30

## 2022-09-30 RX ORDER — LIDOCAINE 50 MG/G
PATCH TOPICAL
Qty: 5 EACH | Refills: 0 | Status: SHIPPED | OUTPATIENT
Start: 2022-09-30

## 2022-09-30 RX ORDER — LIDOCAINE 4 G/100G
1 PATCH TOPICAL
Status: DISCONTINUED | OUTPATIENT
Start: 2022-09-30 | End: 2022-09-30 | Stop reason: HOSPADM

## 2022-09-30 RX ORDER — NAPROXEN 500 MG/1
500 TABLET ORAL 2 TIMES DAILY WITH MEALS
Qty: 20 TABLET | Refills: 0 | Status: SHIPPED | OUTPATIENT
Start: 2022-09-30 | End: 2022-10-10

## 2022-09-30 RX ADMIN — SODIUM CHLORIDE 500 ML: 9 INJECTION, SOLUTION INTRAVENOUS at 04:02

## 2022-09-30 RX ADMIN — KETOROLAC TROMETHAMINE 15 MG: 30 INJECTION, SOLUTION INTRAMUSCULAR at 03:06

## 2022-09-30 NOTE — Clinical Note
[address]  [facility] EMERGENCY DEPT  [address] [facility]  [place] [address]  [phone]    Work/School Note    Date: 9/29/2022    To Whom It May concern:      Anders Steel was seen and treated today in the emergency room by the following provider(s):  Attending Provider: Beena Lua MD.      Anders Steel is excused from work/school on 09/30/22. She is clear to return to work/school on 10/01/22.         Sincerely,          Dung Suero MD

## 2022-09-30 NOTE — DISCHARGE INSTRUCTIONS
It was a pleasure taking care of you in our Emergency Department today. We know that when you come to [facility], you are entrusting us with your health, comfort, and safety. Our physicians and nurses honor that trust, and truly appreciate the opportunity to care for you and your loved ones. We also value your feedback. If you receive a survey about your Emergency Department experience today, please fill it out. We care about our patients' feedback, and we listen to what you have to say. Thank you!       Dr. Corina Quiñonez MD.

## 2022-09-30 NOTE — ED PROVIDER NOTES
EMERGENCY DEPARTMENT HISTORY AND PHYSICAL EXAM     ------------------------------------------------------------------------------------------------------  Please note that this dictation was completed with [employer], the [employer] voice recognition software. Quite often unanticipated grammatical, syntax, homophones, and other interpretive errors are inadvertently transcribed by the computer software. Please disregard these errors. Please excuse any errors that have escaped final proofreading.  -----------------------------------------------------------------------------------------------------------------    Date: 9/30/2022  Patient Name: Tracey Lopez    History of Presenting Illness     Chief Complaint   Patient presents with    Abdominal Pain     Pt arrived to ED from home with RUQ pain that is worse with coughing. Pt reports n/v.       History Provided By: Patient    HPI: Tracey oLpez is a 25 y.o. female, with significant pmhx of liver disorder, ADHD, reflux, anxiety, who presents via private vehicle to the ED with c/o RUQ/R lower rib pain for 1 week with uri type sx. patient reports that her symptoms been ongoing since she was most recently seen here in the ED and diagnosed with acute bronchitis. Expresses concern for having COVID, flu or strep noting that she has friends for similar symptoms that have tested positive. Reports that she has had some sinus drainage as well as a cough that makes her right lower rib pain worse and is tender to palpation. Has not taken any analgesic medication to alleviate her symptoms noting \"I do not like to take them unless is absolutely necessary. \"  Has been taking multiple over-the-counter medications to alleviate her cold symptoms. Pt also specifically denies any recent fevers, chills, nausea, vomiting, diarrhea, changes in BM, urinary sxs, or headache.      PCP: None    Social Hx: denies tobacco, denies EtOH, denies recreational/ Illicit Drugs     There are no other complaints, changes, or physical findings at this time. Allergies   Allergen Reactions    Bee Sting [Sting, Bee] Anaphylaxis    Cat's Claw Anaphylaxis    Poison Ivy Extract Anaphylaxis    Quetiapine Other (comments)     Causes intense suicidal thoughts         Current Facility-Administered Medications   Medication Dose Route Frequency Provider Last Rate Last Admin    lidocaine 4 % patch 1 Patch  1 Patch TransDERmal NOW Dameon Malloy MD   1 Patch at 09/30/22 9727     Current Outpatient Medications   Medication Sig Dispense Refill    naproxen (Naprosyn) 500 mg tablet Take 1 Tablet by mouth two (2) times daily (with meals) for 10 days. 20 Tablet 0    lidocaine (Lidoderm) 5 % Apply patch to the affected area for 12 hours a day and remove for 12 hours a day. 5 Each 0    ondansetron hcl (Zofran) 4 mg tablet Take 1 Tablet by mouth every eight (8) hours as needed for Nausea or Vomiting. 20 Tablet 0    albuterol (PROVENTIL HFA, VENTOLIN HFA, PROAIR HFA) 90 mcg/actuation inhaler Take 2 Puffs by inhalation every four (4) hours as needed for Wheezing. 1 Each 0    traZODone (DESYREL) 50 mg tablet Take 1 Tablet by mouth nightly as needed for Sleep (For insomnia). Indications: insomnia associated with depression 30 Tablet 0    levonorgestreL (Kyleena) 17.5 mcg/24 hrs (5 yrs) 19.5 mg IUD IUD Kyleena 17.5 mcg/24 hrs (5yrs) 19.5mg intrauterine device   Take by intrauterine route.          Past History     Past Medical History:  Past Medical History:   Diagnosis Date    ADHD (attention deficit hyperactivity disorder)     Asthma     exercise induced    Bipolar affective ([address]     Borderline personality disorder ([address]     GERD (gastroesophageal reflux disease)     Nicotine vapor product user     Psychiatric disorder     anxiety, depression, SI's, bipolar depression    Suicidal thoughts        Past Surgical History:  Past Surgical History:   Procedure Laterality Date    HX CHOLECYSTECTOMY      HX HEENT      foreign body removal from ear with ketamine       Family History:  No family history on file. Social History:  Social History     Tobacco Use    Smoking status: Never    Smokeless tobacco: Current    Tobacco comments:     Vaping   Substance Use Topics    Alcohol use: Not Currently     Comment: maybe three times a year    Drug use: No       Allergies: Allergies   Allergen Reactions    Bee Sting [Sting, Bee] Anaphylaxis    Cat's Claw Anaphylaxis    Poison Ivy Extract Anaphylaxis    Quetiapine Other (comments)     Causes intense suicidal thoughts         Review of Systems   Review of Systems   Constitutional: Negative. Negative for fever. Eyes: Negative. Respiratory: Negative. Negative for shortness of breath. Cardiovascular:  Positive for chest pain. Gastrointestinal:  Positive for abdominal pain. Negative for nausea and vomiting. Endocrine: Negative. Genitourinary: Negative. Negative for difficulty urinating, dysuria and hematuria. Musculoskeletal: Negative. Skin: Negative. Neurological: Negative. Psychiatric/Behavioral:  Negative for suicidal ideas. Physical Exam   Physical Exam  Vitals and nursing note reviewed. Constitutional:       General: She is not in acute distress. Appearance: She is well-developed. She is not diaphoretic. HENT:      Head: Normocephalic and atraumatic. Nose: Nose normal.   Eyes:      General: No scleral icterus. Conjunctiva/sclera: Conjunctivae normal.   Neck:      Trachea: No tracheal deviation. Cardiovascular:      Rate and Rhythm: Regular rhythm. Tachycardia present. Heart sounds: Normal heart sounds. No murmur heard. No friction rub. Pulmonary:      Effort: Pulmonary effort is normal. No respiratory distress. Breath sounds: Normal breath sounds. No stridor. No wheezing or rales. Chest:      Chest wall: Tenderness present. Abdominal:      General: Bowel sounds are normal. There is no distension. Palpations: Abdomen is soft. Tenderness: There is no abdominal tenderness. Musculoskeletal:         General: No tenderness. Normal range of motion. Cervical back: Normal range of motion. Skin:     General: Skin is warm and dry. Findings: No rash. Neurological:      Mental Status: She is alert and oriented to person, place, and time. Cranial Nerves: No cranial nerve deficit. Psychiatric:         Behavior: Behavior normal.         Thought Content: Thought content normal.         Judgment: Judgment normal.         Diagnostic Study Results   Labs -     Recent Results (from the past 12 hour(s))   CBC WITH AUTOMATED DIFF    Collection Time: 09/30/22 12:21 AM   Result Value Ref Range    WBC 10.8 3.6 - 11.0 K/uL    RBC 4.46 3.80 - 5.20 M/uL    HGB 13.2 11.5 - 16.0 g/dL    HCT 38.8 35.0 - 47.0 %    MCV 87.0 80.0 - 99.0 FL    MCH 29.6 26.0 - 34.0 PG    MCHC 34.0 30.0 - 36.5 g/dL    RDW 12.8 11.5 - 14.5 %    PLATELET 983 431 - 902 K/uL    MPV 8.9 8.9 - 12.9 FL    NRBC 0.0 0  WBC    ABSOLUTE NRBC 0.00 0.00 - 0.01 K/uL    NEUTROPHILS 47 32 - 75 %    LYMPHOCYTES 38 12 - 49 %    MONOCYTES 10 5 - 13 %    EOSINOPHILS 4 0 - 7 %    BASOPHILS 1 0 - 1 %    IMMATURE GRANULOCYTES 0 0.0 - 0.5 %    ABS. NEUTROPHILS 5.2 1.8 - 8.0 K/UL    ABS. LYMPHOCYTES 4.1 (H) 0.8 - 3.5 K/UL    ABS. MONOCYTES 1.0 0.0 - 1.0 K/UL    ABS. EOSINOPHILS 0.4 0.0 - 0.4 K/UL    ABS. BASOPHILS 0.1 0.0 - 0.1 K/UL    ABS. IMM.  GRANS. 0.0 0.00 - 0.04 K/UL    DF AUTOMATED     METABOLIC PANEL, COMPREHENSIVE    Collection Time: 09/30/22 12:21 AM   Result Value Ref Range    Sodium 137 136 - 145 mmol/L    Potassium 4.0 3.5 - 5.1 mmol/L    Chloride 102 97 - 108 mmol/L    CO2 29 21 - 32 mmol/L    Anion gap 6 5 - 15 mmol/L    Glucose 90 65 - 100 mg/dL    BUN 9 6 - 20 MG/DL    Creatinine 0.62 0.55 - 1.02 MG/DL    BUN/Creatinine ratio 15 12 - 20      GFR est AA >60 >60 ml/min/1.73m2    GFR est non-AA >60 >60 ml/min/1.73m2    Calcium 9.3 8.5 - 10.1 MG/DL    Bilirubin, total 0.4 0.2 - 1.0 MG/DL    ALT (SGPT) 51 12 - 78 U/L    AST (SGOT) 26 15 - 37 U/L    Alk. phosphatase 127 (H) 45 - 117 U/L    Protein, total 8.1 6.4 - 8.2 g/dL    Albumin 3.7 3.5 - 5.0 g/dL    Globulin 4.4 (H) 2.0 - 4.0 g/dL    A-G Ratio 0.8 (L) 1.1 - 2.2     LIPASE    Collection Time: 09/30/22 12:21 AM   Result Value Ref Range    Lipase 94 73 - 393 U/L   D DIMER    Collection Time: 09/30/22  3:01 AM   Result Value Ref Range    D-dimer 0.23 0.00 - 0.65 mg/L FEU   COVID-19 RAPID TEST    Collection Time: 09/30/22  3:47 AM   Result Value Ref Range    Specimen source Nasopharyngeal      COVID-19 rapid test Not detected NOTD     STREP AG SCREEN, GROUP A    Collection Time: 09/30/22  3:47 AM    Specimen: Swab; Throat   Result Value Ref Range    Group A Strep Ag ID Negative NEG     INFLUENZA A+B VIRAL AGS    Collection Time: 09/30/22  3:47 AM   Result Value Ref Range    Influenza A Antigen Negative NEG      Influenza B Antigen Negative NEG         Radiologic Studies -   US ABD LTD   Final Result   No acute abnormality in the right upper abdomen. Status post cholecystectomy. CT Results  (Last 48 hours)      None          CXR Results  (Last 48 hours)                 09/28/22 0435  XR CHEST PA LAT Final result    Impression:      No acute process. Narrative:  EXAM:  XR CHEST PA LAT       INDICATION: Cough       COMPARISON: 3/19/2021       TECHNIQUE: PA and lateral chest views       FINDINGS: The cardiomediastinal and hilar contours are within normal limits. The   pulmonary vasculature is within normal limits. The lungs and pleural spaces are clear. There is no pneumothorax. The visualized   bones and upper abdomen are age-appropriate. Medical Decision Making   I am the first provider for this patient. I reviewed the vital signs, available nursing notes, past medical history, past surgical history, family history and social history.     Vital Signs-Reviewed the patient's vital signs. Patient Vitals for the past 12 hrs:   Temp Pulse Resp BP SpO2   09/29/22 2344 99.5 °F (37.5 °C) (!) 109 16 (!) 109/58 97 %       Pulse Oximetry Analysis - 97% on RA Normal    Records Reviewed/Interpretted: Nursing Notes from triage and Old Medical Records, noting previous ER visit with diagnosis of acute bronchitis    Provider Notes (Medical Decision Making):     DDX:  Retained biliary stone, pancreatitis, acute rib fracture, costochondritis, URI, PE, COVID, strep, flu    Plan:  Labs, chest x-ray, analgesic, ultrasound, COVID, flu, strep swabs    Impression:  Right sided rib pain, URI    ED Course:   Initial assessment performed. The patients presenting problems have been discussed, and they are in agreement with the care plan formulated and outlined with them. I have encouraged them to ask questions as they arise throughout their visit. I reviewed our electronic medical record system for any past medical records that were available that may contribute to the patients current condition, the nursing notes and and vital signs from today's visit  Nursing notes will be reviewed as they become available in realtime while the pt has been in the ED. Adri Alvarado MD      I personally reviewed/interpreted pt's imaging. Agree with official read by radiology as noted above. Adri Alvarado MD      4:23 AM  Progress note:  Pt noted to be feeling better, ready for discharge. Discussed lab and imaging findings with pt, specifically noting no evidence of retained stone, negative D-dimer. Pt will follow up with primary care as instructed. All questions have been answered, pt voiced understanding and agreement with plan. Specific return precautions provided in addition to instructions for pt to return to the ED immediately should sx worsen at any time. Adri Alvarado MD             Critical Care Time:     none      Diagnosis     Clinical Impression:   1. Rib pain on right side    2. Acute URI    3. Costochondritis        PLAN:  1. Current Discharge Medication List        START taking these medications    Details   naproxen (Naprosyn) 500 mg tablet Take 1 Tablet by mouth two (2) times daily (with meals) for 10 days. Qty: 20 Tablet, Refills: 0  Start date: 9/30/2022, End date: 10/10/2022      lidocaine (Lidoderm) 5 % Apply patch to the affected area for 12 hours a day and remove for 12 hours a day. Qty: 5 Each, Refills: 0  Start date: 9/30/2022           2. Follow-up Information       Follow up With Specialties Details Why Contact Info    \[facility]\"" EMERGENCY DEPT Emergency Medicine  As needed, If symptoms worsen [address]  [address] [facility]  [phone]          Return to ED if worse     Disposition:    4:23 AM   The patient's results have been reviewed with family and/or caregiver. They verbally convey their understanding and agreement of the patient's signs, symptoms, diagnosis, treatment and prognosis and additionally agree to follow up as recommended in the discharge instructions or to return to the Emergency Room should the patient's condition change prior to their follow-up appointment. The family and/or caregiver verbally agrees with the care-plan and all of their questions have been answered. The discharge instructions have also been provided to the them with educational information regarding the patient's diagnosis as well a list of reasons why the patient would want to return to the ER prior to their follow-up appointment should their condition change.   Adri Alvarado MD

## 2022-10-01 RX ORDER — PENICILLIN V POTASSIUM 500 MG/1
500 TABLET, FILM COATED ORAL 2 TIMES DAILY
Qty: 20 TABLET | Refills: 0 | Status: SHIPPED | OUTPATIENT
Start: 2022-10-01 | End: 2022-10-11

## 2022-10-02 LAB
BACTERIA SPEC CULT: ABNORMAL
BACTERIA SPEC CULT: ABNORMAL
SERVICE CMNT-IMP: ABNORMAL

## 2022-10-03 NOTE — PROGRESS NOTES
RE strep: Patient not treated empirically during ED visit. Left HIPAA compliant voicemail for return call.

## 2022-10-04 RX ORDER — AMOXICILLIN 875 MG/1
875 TABLET, FILM COATED ORAL 2 TIMES DAILY
Qty: 20 TABLET | Refills: 0 | Status: SHIPPED | OUTPATIENT
Start: 2022-10-04 | End: 2022-10-14

## 2022-10-04 NOTE — PROGRESS NOTES
Voicemail left with instructions to call the ED back. Patient was notified antibiotics will be sent to her pharmacy. Amoxicillin sent to the pharmacy on record.

## 2022-10-06 NOTE — PROGRESS NOTES
I just spoke to the patient over the telephone and she makes me aware that she picked up her antibiotics. She is aware of her positive throat culture. She tells me she is feeling better. Return precautions for any concerns.

## 2023-05-18 ENCOUNTER — APPOINTMENT (OUTPATIENT)
Facility: HOSPITAL | Age: 23
End: 2023-05-18
Payer: COMMERCIAL

## 2023-05-18 ENCOUNTER — HOSPITAL ENCOUNTER (EMERGENCY)
Facility: HOSPITAL | Age: 23
Discharge: HOME | End: 2023-05-18
Attending: EMERGENCY MEDICINE
Payer: COMMERCIAL

## 2023-05-18 VITALS
HEART RATE: 90 BPM | DIASTOLIC BLOOD PRESSURE: 59 MMHG | OXYGEN SATURATION: 98 % | RESPIRATION RATE: 16 BRPM | TEMPERATURE: 98.2 F | SYSTOLIC BLOOD PRESSURE: 99 MMHG

## 2023-05-18 DIAGNOSIS — E86.0 DEHYDRATION: ICD-10-CM

## 2023-05-18 DIAGNOSIS — S16.1XXA ACUTE STRAIN OF NECK MUSCLE, INITIAL ENCOUNTER: ICD-10-CM

## 2023-05-18 DIAGNOSIS — S06.0X1A CONCUSSION WITH LOSS OF CONSCIOUSNESS OF 30 MINUTES OR LESS, INITIAL ENCOUNTER: ICD-10-CM

## 2023-05-18 DIAGNOSIS — S09.90XA CLOSED HEAD INJURY, INITIAL ENCOUNTER: Primary | ICD-10-CM

## 2023-05-18 LAB
ALBUMIN SERPL-MCNC: 3.3 G/DL (ref 3.5–5)
ALBUMIN/GLOB SERPL: 0.8 (ref 1.1–2.2)
ALP SERPL-CCNC: 98 U/L (ref 45–117)
ALT SERPL-CCNC: 29 U/L (ref 12–78)
ANION GAP SERPL CALC-SCNC: 4 MMOL/L (ref 5–15)
AST SERPL-CCNC: 18 U/L (ref 15–37)
BASOPHILS # BLD: 0 K/UL (ref 0–0.1)
BASOPHILS NFR BLD: 0 % (ref 0–1)
BILIRUB SERPL-MCNC: 0.2 MG/DL (ref 0.2–1)
BUN SERPL-MCNC: 14 MG/DL (ref 6–20)
BUN/CREAT SERPL: 30 (ref 12–20)
CALCIUM SERPL-MCNC: 8.6 MG/DL (ref 8.5–10.1)
CHLORIDE SERPL-SCNC: 110 MMOL/L (ref 97–108)
CO2 SERPL-SCNC: 25 MMOL/L (ref 21–32)
CREAT SERPL-MCNC: 0.47 MG/DL (ref 0.55–1.02)
DIFFERENTIAL METHOD BLD: ABNORMAL
EOSINOPHIL # BLD: 0.1 K/UL (ref 0–0.4)
EOSINOPHIL NFR BLD: 1 % (ref 0–7)
ERYTHROCYTE [DISTWIDTH] IN BLOOD BY AUTOMATED COUNT: 11.8 % (ref 11.5–14.5)
GLOBULIN SER CALC-MCNC: 4.3 G/DL (ref 2–4)
GLUCOSE SERPL-MCNC: 95 MG/DL (ref 65–100)
HCT VFR BLD AUTO: 39.2 % (ref 35–47)
HGB BLD-MCNC: 13.1 G/DL (ref 11.5–16)
IMM GRANULOCYTES # BLD AUTO: 0.1 K/UL (ref 0–0.04)
IMM GRANULOCYTES NFR BLD AUTO: 1 % (ref 0–0.5)
LYMPHOCYTES # BLD: 3.7 K/UL (ref 0.8–3.5)
LYMPHOCYTES NFR BLD: 41 % (ref 12–49)
MAGNESIUM SERPL-MCNC: 1.9 MG/DL (ref 1.6–2.4)
MCH RBC QN AUTO: 29.8 PG (ref 26–34)
MCHC RBC AUTO-ENTMCNC: 33.4 G/DL (ref 30–36.5)
MCV RBC AUTO: 89.1 FL (ref 80–99)
MONOCYTES # BLD: 0.6 K/UL (ref 0–1)
MONOCYTES NFR BLD: 6 % (ref 5–13)
NEUTS SEG # BLD: 4.7 K/UL (ref 1.8–8)
NEUTS SEG NFR BLD: 51 % (ref 32–75)
NRBC # BLD: 0 K/UL (ref 0–0.01)
NRBC BLD-RTO: 0 PER 100 WBC
PLATELET # BLD AUTO: 328 K/UL (ref 150–400)
PMV BLD AUTO: 9.2 FL (ref 8.9–12.9)
POTASSIUM SERPL-SCNC: 3.7 MMOL/L (ref 3.5–5.1)
PROT SERPL-MCNC: 7.6 G/DL (ref 6.4–8.2)
RBC # BLD AUTO: 4.4 M/UL (ref 3.8–5.2)
SODIUM SERPL-SCNC: 139 MMOL/L (ref 136–145)
WBC # BLD AUTO: 9.2 K/UL (ref 3.6–11)

## 2023-05-18 PROCEDURE — 85025 COMPLETE CBC W/AUTO DIFF WBC: CPT

## 2023-05-18 PROCEDURE — 6370000000 HC RX 637 (ALT 250 FOR IP): Performed by: EMERGENCY MEDICINE

## 2023-05-18 PROCEDURE — 99285 EMERGENCY DEPT VISIT HI MDM: CPT

## 2023-05-18 PROCEDURE — 36415 COLL VENOUS BLD VENIPUNCTURE: CPT

## 2023-05-18 PROCEDURE — 70450 CT HEAD/BRAIN W/O DYE: CPT

## 2023-05-18 PROCEDURE — 71045 X-RAY EXAM CHEST 1 VIEW: CPT

## 2023-05-18 PROCEDURE — 80053 COMPREHEN METABOLIC PANEL: CPT

## 2023-05-18 PROCEDURE — 72125 CT NECK SPINE W/O DYE: CPT

## 2023-05-18 PROCEDURE — 6360000002 HC RX W HCPCS: Performed by: EMERGENCY MEDICINE

## 2023-05-18 PROCEDURE — 96374 THER/PROPH/DIAG INJ IV PUSH: CPT

## 2023-05-18 PROCEDURE — 83735 ASSAY OF MAGNESIUM: CPT

## 2023-05-18 PROCEDURE — 96361 HYDRATE IV INFUSION ADD-ON: CPT

## 2023-05-18 PROCEDURE — 2580000003 HC RX 258: Performed by: EMERGENCY MEDICINE

## 2023-05-18 PROCEDURE — 93005 ELECTROCARDIOGRAM TRACING: CPT | Performed by: EMERGENCY MEDICINE

## 2023-05-18 RX ORDER — 0.9 % SODIUM CHLORIDE 0.9 %
1000 INTRAVENOUS SOLUTION INTRAVENOUS ONCE
Status: COMPLETED | OUTPATIENT
Start: 2023-05-18 | End: 2023-05-18

## 2023-05-18 RX ORDER — ONDANSETRON 4 MG/1
4 TABLET, FILM COATED ORAL 3 TIMES DAILY PRN
Qty: 15 TABLET | Refills: 0 | Status: SHIPPED | OUTPATIENT
Start: 2023-05-18

## 2023-05-18 RX ORDER — 0.9 % SODIUM CHLORIDE 0.9 %
500 INTRAVENOUS SOLUTION INTRAVENOUS ONCE
Status: DISCONTINUED | OUTPATIENT
Start: 2023-05-18 | End: 2023-05-18

## 2023-05-18 RX ORDER — BUTALBITAL, ACETAMINOPHEN AND CAFFEINE 50; 325; 40 MG/1; MG/1; MG/1
1 TABLET ORAL
Status: COMPLETED | OUTPATIENT
Start: 2023-05-18 | End: 2023-05-18

## 2023-05-18 RX ORDER — KETOROLAC TROMETHAMINE 30 MG/ML
30 INJECTION, SOLUTION INTRAMUSCULAR; INTRAVENOUS
Status: COMPLETED | OUTPATIENT
Start: 2023-05-18 | End: 2023-05-18

## 2023-05-18 RX ORDER — KETOROLAC TROMETHAMINE 10 MG/1
10 TABLET, FILM COATED ORAL EVERY 6 HOURS PRN
Qty: 10 TABLET | Refills: 0 | Status: SHIPPED | OUTPATIENT
Start: 2023-05-18

## 2023-05-18 RX ORDER — BUTALBITAL, ACETAMINOPHEN AND CAFFEINE 300; 40; 50 MG/1; MG/1; MG/1
1 CAPSULE ORAL EVERY 4 HOURS PRN
Qty: 20 CAPSULE | Refills: 0 | Status: SHIPPED | OUTPATIENT
Start: 2023-05-18

## 2023-05-18 RX ADMIN — KETOROLAC TROMETHAMINE 30 MG: 30 INJECTION, SOLUTION INTRAMUSCULAR; INTRAVENOUS at 22:23

## 2023-05-18 RX ADMIN — BUTALBITAL, ACETAMINOPHEN, AND CAFFEINE 1 TABLET: 50; 325; 40 TABLET ORAL at 21:21

## 2023-05-18 RX ADMIN — SODIUM CHLORIDE 1000 ML: 9 INJECTION, SOLUTION INTRAVENOUS at 22:24

## 2023-05-18 RX ADMIN — SODIUM CHLORIDE 1000 ML: 9 INJECTION, SOLUTION INTRAVENOUS at 21:43

## 2023-05-18 RX ADMIN — SODIUM CHLORIDE 500 ML: 9 INJECTION, SOLUTION INTRAVENOUS at 21:14

## 2023-05-19 LAB
EKG ATRIAL RATE: 69 BPM
EKG DIAGNOSIS: NORMAL
EKG P AXIS: 46 DEGREES
EKG P-R INTERVAL: 160 MS
EKG Q-T INTERVAL: 364 MS
EKG QRS DURATION: 84 MS
EKG QTC CALCULATION (BAZETT): 390 MS
EKG R AXIS: 79 DEGREES
EKG T AXIS: 25 DEGREES
EKG VENTRICULAR RATE: 69 BPM

## 2023-05-19 NOTE — ED PROVIDER NOTES
[facility] EMERGENCY DEPT  EMERGENCY DEPARTMENT ENCOUNTER       Pt Name: Annetta Habermann  MRN: [id number]  [place] 2000  Date of evaluation: 5/18/2023  Provider: Annie Villela MD   PCP: SILVESTRE Valdez NP  Note Started: 10:31 PM 5/18/23     CHIEF COMPLAINT       Chief Complaint   Patient presents with    Head Injury     Patient arrives ambulatory to triage with complaint of possible concussion. Patient was at bar on Saturday and fell off of mechanical bull and hit head on control box. Patient reports that she has been dizzy and has a knot on her head since injury as well as light sensitivity. HISTORY OF PRESENT ILLNESS: 1 or more elements      History From: Patient, History limited by: none     Annetta Habermann is a 25 y.o. female patient with a history of anxiety, reflux, presents with headache and dizziness. 5 days ago, she fell off a mechanical bull at a bar, and hit her head on a control box. She did not lose consciousness, and has had severe headaches off and on since then. She also has posterior neck pain, nausea but no vomiting. She could not sleep over the last few nights because of her pain. She did not take anything for pain, because she does not believe in taking a lot of pain medication. She says she always has shortness of breath but believes that is due to vaping. She denies abdominal pain, dysuria, cough or diarrhea. She has not been eating much recently, because of decreased appetite. She is not on blood thinners. Please See MDM for Additional Details of the HPI/PMH  Nursing Notes were all reviewed and agreed with or any disagreements were addressed in the HPI. REVIEW OF SYSTEMS        Positives and Pertinent negatives as per HPI.     PAST HISTORY     Past Medical History:  Past Medical History:   Diagnosis Date    ADHD (attention deficit hyperactivity disorder)     Asthma     exercise induced    Bipolar affective ([address]     Borderline personality disorder ([facility] [address]     GERD

## 2023-09-25 ENCOUNTER — APPOINTMENT (OUTPATIENT)
Facility: HOSPITAL | Age: 23
End: 2023-09-25
Payer: COMMERCIAL

## 2023-09-25 ENCOUNTER — HOSPITAL ENCOUNTER (EMERGENCY)
Facility: HOSPITAL | Age: 23
Discharge: HOME | End: 2023-09-26
Payer: COMMERCIAL

## 2023-09-25 VITALS
WEIGHT: 150.35 LBS | OXYGEN SATURATION: 98 % | DIASTOLIC BLOOD PRESSURE: 63 MMHG | HEART RATE: 72 BPM | HEIGHT: 63 IN | SYSTOLIC BLOOD PRESSURE: 101 MMHG | BODY MASS INDEX: 26.64 KG/M2 | TEMPERATURE: 98.8 F | RESPIRATION RATE: 18 BRPM

## 2023-09-25 DIAGNOSIS — N30.00 ACUTE CYSTITIS WITHOUT HEMATURIA: Primary | ICD-10-CM

## 2023-09-25 DIAGNOSIS — R10.2 SUPRAPUBIC ABDOMINAL PAIN: ICD-10-CM

## 2023-09-25 LAB
ALBUMIN SERPL-MCNC: 3.7 G/DL (ref 3.5–5)
ALBUMIN/GLOB SERPL: 0.9 (ref 1.1–2.2)
ALP SERPL-CCNC: 110 U/L (ref 45–117)
ALT SERPL-CCNC: 39 U/L (ref 12–78)
ANION GAP SERPL CALC-SCNC: 3 MMOL/L (ref 5–15)
APPEARANCE UR: ABNORMAL
AST SERPL-CCNC: 27 U/L (ref 15–37)
BACTERIA URNS QL MICRO: ABNORMAL /HPF
BASOPHILS # BLD: 0 K/UL (ref 0–0.1)
BASOPHILS NFR BLD: 0 % (ref 0–1)
BILIRUB SERPL-MCNC: 0.2 MG/DL (ref 0.2–1)
BILIRUB UR QL: NEGATIVE
BUN SERPL-MCNC: 16 MG/DL (ref 6–20)
BUN/CREAT SERPL: 23 (ref 12–20)
CALCIUM SERPL-MCNC: 9.1 MG/DL (ref 8.5–10.1)
CHLORIDE SERPL-SCNC: 107 MMOL/L (ref 97–108)
CO2 SERPL-SCNC: 30 MMOL/L (ref 21–32)
COLOR UR: ABNORMAL
COMMENT:: NORMAL
COMMENT:: NORMAL
CREAT SERPL-MCNC: 0.69 MG/DL (ref 0.55–1.02)
DIFFERENTIAL METHOD BLD: ABNORMAL
EOSINOPHIL # BLD: 0.1 K/UL (ref 0–0.4)
EOSINOPHIL NFR BLD: 1 % (ref 0–7)
EPITH CASTS URNS QL MICRO: ABNORMAL /LPF
ERYTHROCYTE [DISTWIDTH] IN BLOOD BY AUTOMATED COUNT: 11.5 % (ref 11.5–14.5)
GLOBULIN SER CALC-MCNC: 3.9 G/DL (ref 2–4)
GLUCOSE SERPL-MCNC: 95 MG/DL (ref 65–100)
GLUCOSE UR STRIP.AUTO-MCNC: NEGATIVE MG/DL
HCG UR QL: NEGATIVE
HCT VFR BLD AUTO: 39.9 % (ref 35–47)
HGB BLD-MCNC: 13.4 G/DL (ref 11.5–16)
HGB UR QL STRIP: NEGATIVE
HYALINE CASTS URNS QL MICRO: ABNORMAL /LPF (ref 0–2)
IMM GRANULOCYTES # BLD AUTO: 0 K/UL (ref 0–0.04)
IMM GRANULOCYTES NFR BLD AUTO: 0 % (ref 0–0.5)
KETONES UR QL STRIP.AUTO: NEGATIVE MG/DL
LEUKOCYTE ESTERASE UR QL STRIP.AUTO: ABNORMAL
LIPASE SERPL-CCNC: 118 U/L (ref 73–393)
LYMPHOCYTES # BLD: 4 K/UL (ref 0.8–3.5)
LYMPHOCYTES NFR BLD: 47 % (ref 12–49)
MCH RBC QN AUTO: 30.2 PG (ref 26–34)
MCHC RBC AUTO-ENTMCNC: 33.6 G/DL (ref 30–36.5)
MCV RBC AUTO: 89.9 FL (ref 80–99)
MONOCYTES # BLD: 0.7 K/UL (ref 0–1)
MONOCYTES NFR BLD: 8 % (ref 5–13)
NEUTS SEG # BLD: 3.7 K/UL (ref 1.8–8)
NEUTS SEG NFR BLD: 44 % (ref 32–75)
NITRITE UR QL STRIP.AUTO: NEGATIVE
NRBC # BLD: 0 K/UL (ref 0–0.01)
NRBC BLD-RTO: 0 PER 100 WBC
PH UR STRIP: 8 (ref 5–8)
PLATELET # BLD AUTO: 290 K/UL (ref 150–400)
PMV BLD AUTO: 9.9 FL (ref 8.9–12.9)
POTASSIUM SERPL-SCNC: 3.8 MMOL/L (ref 3.5–5.1)
PROT SERPL-MCNC: 7.6 G/DL (ref 6.4–8.2)
PROT UR STRIP-MCNC: NEGATIVE MG/DL
RBC # BLD AUTO: 4.44 M/UL (ref 3.8–5.2)
RBC #/AREA URNS HPF: ABNORMAL /HPF (ref 0–5)
SODIUM SERPL-SCNC: 140 MMOL/L (ref 136–145)
SP GR UR REFRACTOMETRY: 1.02
SPECIMEN HOLD: NORMAL
SPECIMEN HOLD: NORMAL
UROBILINOGEN UR QL STRIP.AUTO: 1 EU/DL (ref 0.2–1)
WBC # BLD AUTO: 8.6 K/UL (ref 3.6–11)
WBC URNS QL MICRO: ABNORMAL /HPF (ref 0–4)

## 2023-09-25 PROCEDURE — 96375 TX/PRO/DX INJ NEW DRUG ADDON: CPT

## 2023-09-25 PROCEDURE — 99285 EMERGENCY DEPT VISIT HI MDM: CPT

## 2023-09-25 PROCEDURE — 74177 CT ABD & PELVIS W/CONTRAST: CPT

## 2023-09-25 PROCEDURE — 6360000004 HC RX CONTRAST MEDICATION: Performed by: RADIOLOGY

## 2023-09-25 PROCEDURE — 36415 COLL VENOUS BLD VENIPUNCTURE: CPT

## 2023-09-25 PROCEDURE — 83690 ASSAY OF LIPASE: CPT

## 2023-09-25 PROCEDURE — 6360000002 HC RX W HCPCS: Performed by: PHYSICIAN ASSISTANT

## 2023-09-25 PROCEDURE — 80053 COMPREHEN METABOLIC PANEL: CPT

## 2023-09-25 PROCEDURE — 81001 URINALYSIS AUTO W/SCOPE: CPT

## 2023-09-25 PROCEDURE — 85025 COMPLETE CBC W/AUTO DIFF WBC: CPT

## 2023-09-25 PROCEDURE — 81025 URINE PREGNANCY TEST: CPT

## 2023-09-25 PROCEDURE — 96374 THER/PROPH/DIAG INJ IV PUSH: CPT

## 2023-09-25 RX ORDER — ONDANSETRON 2 MG/ML
4 INJECTION INTRAMUSCULAR; INTRAVENOUS
Status: COMPLETED | OUTPATIENT
Start: 2023-09-25 | End: 2023-09-25

## 2023-09-25 RX ORDER — KETOROLAC TROMETHAMINE 30 MG/ML
30 INJECTION, SOLUTION INTRAMUSCULAR; INTRAVENOUS
Status: COMPLETED | OUTPATIENT
Start: 2023-09-25 | End: 2023-09-25

## 2023-09-25 RX ADMIN — ONDANSETRON 4 MG: 2 INJECTION INTRAMUSCULAR; INTRAVENOUS at 23:06

## 2023-09-25 RX ADMIN — KETOROLAC TROMETHAMINE 30 MG: 30 INJECTION, SOLUTION INTRAMUSCULAR; INTRAVENOUS at 23:06

## 2023-09-25 RX ADMIN — IOPAMIDOL 100 ML: 755 INJECTION, SOLUTION INTRAVENOUS at 23:42

## 2023-09-25 ASSESSMENT — PAIN DESCRIPTION - LOCATION: LOCATION: ABDOMEN;BACK

## 2023-09-25 ASSESSMENT — PAIN SCALES - GENERAL: PAINLEVEL_OUTOF10: 8

## 2023-09-25 ASSESSMENT — PAIN - FUNCTIONAL ASSESSMENT: PAIN_FUNCTIONAL_ASSESSMENT: 0-10

## 2023-09-26 PROCEDURE — 6370000000 HC RX 637 (ALT 250 FOR IP): Performed by: PHYSICIAN ASSISTANT

## 2023-09-26 RX ORDER — PHENAZOPYRIDINE HYDROCHLORIDE 100 MG/1
100 TABLET, FILM COATED ORAL 3 TIMES DAILY PRN
Qty: 6 TABLET | Refills: 0 | Status: SHIPPED | OUTPATIENT
Start: 2023-09-26 | End: 2023-09-28

## 2023-09-26 RX ORDER — PHENAZOPYRIDINE HYDROCHLORIDE 100 MG/1
200 TABLET, FILM COATED ORAL
Status: COMPLETED | OUTPATIENT
Start: 2023-09-26 | End: 2023-09-26

## 2023-09-26 RX ORDER — CEPHALEXIN 250 MG/1
500 CAPSULE ORAL
Status: COMPLETED | OUTPATIENT
Start: 2023-09-26 | End: 2023-09-26

## 2023-09-26 RX ORDER — CEPHALEXIN 500 MG/1
500 CAPSULE ORAL 3 TIMES DAILY
Qty: 21 CAPSULE | Refills: 0 | Status: SHIPPED | OUTPATIENT
Start: 2023-09-26 | End: 2023-10-03

## 2023-09-26 RX ADMIN — CEPHALEXIN 500 MG: 250 CAPSULE ORAL at 00:23

## 2023-09-26 RX ADMIN — PHENAZOPYRIDINE 200 MG: 100 TABLET ORAL at 00:23

## 2023-09-26 NOTE — ED PROVIDER NOTES
worsen       DISCHARGE MEDICATIONS:     Medication List        START taking these medications      cephALEXin 500 MG capsule  Commonly known as: KEFLEX  Take 1 capsule by mouth 3 times daily for 7 days     phenazopyridine 100 MG tablet  Commonly known as: Pyridium  Take 1 tablet by mouth 3 times daily as needed for Pain            ASK your doctor about these medications      albuterol sulfate  (90 Base) MCG/ACT inhaler  Commonly known as: PROVENTIL;VENTOLIN;PROAIR     butalbital-APAP-caffeine -40 MG Caps per capsule  Commonly known as: Fioricet  Take 1 capsule by mouth every 4 hours as needed for Headaches     ketorolac 10 MG tablet  Commonly known as: TORADOL  Take 1 tablet by mouth every 6 hours as needed for Pain     Kyleena IUD 19.5 mg  Generic drug: Levonorgestrel     lidocaine 5 %  Commonly known as: LIDODERM     ondansetron 4 MG tablet  Commonly known as: ZOFRAN  Take 1 tablet by mouth 3 times daily as needed for Nausea or Vomiting     traZODone 50 MG tablet  Commonly known as: DESYREL               Where to Get Your Medications        These medications were sent to [facility]/pharmacy #[id number] [phone]  [place] [address]      Phone: [phone]   cephALEXin 500 MG capsule  phenazopyridine 100 MG tablet           DISCONTINUED MEDICATIONS:  Current Discharge Medication List        I have seen and evaluated the patient autonomously. My supervision physician was on site and available for consultation if needed. I am the Primary Clinician of Record. MAO Espinal (electronically signed)    (Please note that parts of this dictation were completed with voice recognition software. Quite often unanticipated grammatical, syntax, homophones, and other interpretive errors are inadvertently transcribed by the computer software. Please disregards these errors.  Please excuse any errors that have escaped final

## 2023-09-26 NOTE — DISCHARGE INSTRUCTIONS
Thank You! It was a pleasure taking care of you in our Emergency Department today. We know that when you come to our Emergency Department, you are entrusting us with your health, comfort, and safety. Our physicians and nurses honor that trust, and truly appreciate the opportunity to care for you and your loved ones. We also value your feedback. If you receive a survey about your Emergency Department experience today, please fill it out. We care about our patients' feedback, and we listen to what you have to say. Thank you. Raymon Lopez PA-C      ________________________________________________________________________  I have included a copy of your lab results and/or radiologic studies from today's visit so you can have them easily available at your follow-up visit. We hope you feel better and please do not hesitate to contact the ED if you have any questions at all!     Recent Results (from the past 12 hour(s))   CBC with Diff    Collection Time: 09/25/23 10:15 PM   Result Value Ref Range    WBC 8.6 3.6 - 11.0 K/uL    RBC 4.44 3.80 - 5.20 M/uL    Hemoglobin 13.4 11.5 - 16.0 g/dL    Hematocrit 39.9 35.0 - 47.0 %    MCV 89.9 80.0 - 99.0 FL    MCH 30.2 26.0 - 34.0 PG    MCHC 33.6 30.0 - 36.5 g/dL    RDW 11.5 11.5 - 14.5 %    Platelets 443 168 - 937 K/uL    MPV 9.9 8.9 - 12.9 FL    Nucleated RBCs 0.0 0  WBC    nRBC 0.00 0.00 - 0.01 K/uL    Neutrophils % 44 32 - 75 %    Lymphocytes % 47 12 - 49 %    Monocytes % 8 5 - 13 %    Eosinophils % 1 0 - 7 %    Basophils % 0 0 - 1 %    Immature Granulocytes 0 0.0 - 0.5 %    Neutrophils Absolute 3.7 1.8 - 8.0 K/UL    Lymphocytes Absolute 4.0 (H) 0.8 - 3.5 K/UL    Monocytes Absolute 0.7 0.0 - 1.0 K/UL    Eosinophils Absolute 0.1 0.0 - 0.4 K/UL    Basophils Absolute 0.0 0.0 - 0.1 K/UL    Absolute Immature Granulocyte 0.0 0.00 - 0.04 K/UL    Differential Type AUTOMATED     CMP    Collection Time: 09/25/23 10:15 PM   Result Value Ref Range    Sodium 140 136 -

## 2023-11-30 ENCOUNTER — HOSPITAL ENCOUNTER (EMERGENCY)
Facility: HOSPITAL | Age: 23
Discharge: HOME | End: 2023-11-30
Attending: STUDENT IN AN ORGANIZED HEALTH CARE EDUCATION/TRAINING PROGRAM
Payer: COMMERCIAL

## 2023-11-30 ENCOUNTER — APPOINTMENT (OUTPATIENT)
Facility: HOSPITAL | Age: 23
End: 2023-11-30
Payer: COMMERCIAL

## 2023-11-30 VITALS
TEMPERATURE: 98.4 F | OXYGEN SATURATION: 99 % | HEART RATE: 85 BPM | DIASTOLIC BLOOD PRESSURE: 61 MMHG | BODY MASS INDEX: 26.24 KG/M2 | WEIGHT: 148.15 LBS | SYSTOLIC BLOOD PRESSURE: 90 MMHG | RESPIRATION RATE: 18 BRPM

## 2023-11-30 DIAGNOSIS — N30.00 ACUTE CYSTITIS WITHOUT HEMATURIA: Primary | ICD-10-CM

## 2023-11-30 DIAGNOSIS — R10.84 GENERALIZED ABDOMINAL PAIN: ICD-10-CM

## 2023-11-30 LAB
ALBUMIN SERPL-MCNC: 4.1 G/DL (ref 3.5–5)
ALBUMIN/GLOB SERPL: 1.1 (ref 1.1–2.2)
ALP SERPL-CCNC: 127 U/L (ref 45–117)
ALT SERPL-CCNC: 51 U/L (ref 12–78)
AMORPH CRY URNS QL MICRO: ABNORMAL
ANION GAP SERPL CALC-SCNC: 6 MMOL/L (ref 5–15)
APPEARANCE UR: ABNORMAL
AST SERPL-CCNC: 30 U/L (ref 15–37)
BACTERIA URNS QL MICRO: ABNORMAL /HPF
BASOPHILS # BLD: 0 K/UL (ref 0–0.1)
BASOPHILS NFR BLD: 1 % (ref 0–1)
BILIRUB SERPL-MCNC: 0.7 MG/DL (ref 0.2–1)
BILIRUB UR QL: NEGATIVE
BUN SERPL-MCNC: 12 MG/DL (ref 6–20)
BUN/CREAT SERPL: 18 (ref 12–20)
CALCIUM SERPL-MCNC: 9.5 MG/DL (ref 8.5–10.1)
CHLORIDE SERPL-SCNC: 104 MMOL/L (ref 97–108)
CO2 SERPL-SCNC: 28 MMOL/L (ref 21–32)
COLOR UR: ABNORMAL
CREAT SERPL-MCNC: 0.68 MG/DL (ref 0.55–1.02)
DIFFERENTIAL METHOD BLD: NORMAL
EOSINOPHIL # BLD: 0.1 K/UL (ref 0–0.4)
EOSINOPHIL NFR BLD: 1 % (ref 0–7)
EPITH CASTS URNS QL MICRO: ABNORMAL /LPF
ERYTHROCYTE [DISTWIDTH] IN BLOOD BY AUTOMATED COUNT: 11.6 % (ref 11.5–14.5)
GLOBULIN SER CALC-MCNC: 3.7 G/DL (ref 2–4)
GLUCOSE SERPL-MCNC: 85 MG/DL (ref 65–100)
GLUCOSE UR STRIP.AUTO-MCNC: NEGATIVE MG/DL
HCG UR QL: NEGATIVE
HCT VFR BLD AUTO: 44.6 % (ref 35–47)
HGB BLD-MCNC: 15 G/DL (ref 11.5–16)
HGB UR QL STRIP: NEGATIVE
IMM GRANULOCYTES # BLD AUTO: 0 K/UL (ref 0–0.04)
IMM GRANULOCYTES NFR BLD AUTO: 0 % (ref 0–0.5)
KETONES UR QL STRIP.AUTO: NEGATIVE MG/DL
LEUKOCYTE ESTERASE UR QL STRIP.AUTO: ABNORMAL
LIPASE SERPL-CCNC: 18 U/L (ref 13–75)
LYMPHOCYTES # BLD: 2.6 K/UL (ref 0.8–3.5)
LYMPHOCYTES NFR BLD: 34 % (ref 12–49)
MCH RBC QN AUTO: 30.8 PG (ref 26–34)
MCHC RBC AUTO-ENTMCNC: 33.6 G/DL (ref 30–36.5)
MCV RBC AUTO: 91.6 FL (ref 80–99)
MONOCYTES # BLD: 0.7 K/UL (ref 0–1)
MONOCYTES NFR BLD: 9 % (ref 5–13)
NEUTS SEG # BLD: 4.3 K/UL (ref 1.8–8)
NEUTS SEG NFR BLD: 55 % (ref 32–75)
NITRITE UR QL STRIP.AUTO: NEGATIVE
NRBC # BLD: 0 K/UL (ref 0–0.01)
NRBC BLD-RTO: 0 PER 100 WBC
PH UR STRIP: 7 (ref 5–8)
PLATELET # BLD AUTO: 301 K/UL (ref 150–400)
PMV BLD AUTO: 9.4 FL (ref 8.9–12.9)
POTASSIUM SERPL-SCNC: 4.4 MMOL/L (ref 3.5–5.1)
PROT SERPL-MCNC: 7.8 G/DL (ref 6.4–8.2)
PROT UR STRIP-MCNC: ABNORMAL MG/DL
RBC # BLD AUTO: 4.87 M/UL (ref 3.8–5.2)
RBC #/AREA URNS HPF: ABNORMAL /HPF (ref 0–5)
SODIUM SERPL-SCNC: 138 MMOL/L (ref 136–145)
SP GR UR REFRACTOMETRY: 1.02
URINE CULTURE IF INDICATED: ABNORMAL
UROBILINOGEN UR QL STRIP.AUTO: 0.2 EU/DL (ref 0.2–1)
WBC # BLD AUTO: 7.8 K/UL (ref 3.6–11)
WBC URNS QL MICRO: ABNORMAL /HPF (ref 0–4)

## 2023-11-30 PROCEDURE — 81025 URINE PREGNANCY TEST: CPT

## 2023-11-30 PROCEDURE — 85025 COMPLETE CBC W/AUTO DIFF WBC: CPT

## 2023-11-30 PROCEDURE — 83690 ASSAY OF LIPASE: CPT

## 2023-11-30 PROCEDURE — 87086 URINE CULTURE/COLONY COUNT: CPT

## 2023-11-30 PROCEDURE — 6360000002 HC RX W HCPCS: Performed by: STUDENT IN AN ORGANIZED HEALTH CARE EDUCATION/TRAINING PROGRAM

## 2023-11-30 PROCEDURE — 96374 THER/PROPH/DIAG INJ IV PUSH: CPT

## 2023-11-30 PROCEDURE — 36415 COLL VENOUS BLD VENIPUNCTURE: CPT

## 2023-11-30 PROCEDURE — 6360000004 HC RX CONTRAST MEDICATION: Performed by: RADIOLOGY

## 2023-11-30 PROCEDURE — 99285 EMERGENCY DEPT VISIT HI MDM: CPT

## 2023-11-30 PROCEDURE — 81001 URINALYSIS AUTO W/SCOPE: CPT

## 2023-11-30 PROCEDURE — 80053 COMPREHEN METABOLIC PANEL: CPT

## 2023-11-30 PROCEDURE — 2580000003 HC RX 258: Performed by: STUDENT IN AN ORGANIZED HEALTH CARE EDUCATION/TRAINING PROGRAM

## 2023-11-30 PROCEDURE — 74177 CT ABD & PELVIS W/CONTRAST: CPT

## 2023-11-30 PROCEDURE — 96375 TX/PRO/DX INJ NEW DRUG ADDON: CPT

## 2023-11-30 PROCEDURE — 96361 HYDRATE IV INFUSION ADD-ON: CPT

## 2023-11-30 RX ORDER — ONDANSETRON 4 MG/1
4 TABLET, ORALLY DISINTEGRATING ORAL 3 TIMES DAILY PRN
Qty: 8 TABLET | Refills: 0 | Status: SHIPPED | OUTPATIENT
Start: 2023-11-30

## 2023-11-30 RX ORDER — KETOROLAC TROMETHAMINE 30 MG/ML
15 INJECTION, SOLUTION INTRAMUSCULAR; INTRAVENOUS ONCE
Status: COMPLETED | OUTPATIENT
Start: 2023-11-30 | End: 2023-11-30

## 2023-11-30 RX ORDER — DROPERIDOL 2.5 MG/ML
0.62 INJECTION, SOLUTION INTRAMUSCULAR; INTRAVENOUS ONCE
Status: COMPLETED | OUTPATIENT
Start: 2023-11-30 | End: 2023-11-30

## 2023-11-30 RX ORDER — SODIUM CHLORIDE, SODIUM LACTATE, POTASSIUM CHLORIDE, AND CALCIUM CHLORIDE .6; .31; .03; .02 G/100ML; G/100ML; G/100ML; G/100ML
1000 INJECTION, SOLUTION INTRAVENOUS ONCE
Status: COMPLETED | OUTPATIENT
Start: 2023-11-30 | End: 2023-11-30

## 2023-11-30 RX ORDER — CEPHALEXIN 500 MG/1
500 CAPSULE ORAL 2 TIMES DAILY
Qty: 14 CAPSULE | Refills: 0 | Status: SHIPPED | OUTPATIENT
Start: 2023-11-30 | End: 2023-12-07

## 2023-11-30 RX ADMIN — SODIUM CHLORIDE, POTASSIUM CHLORIDE, SODIUM LACTATE AND CALCIUM CHLORIDE 1000 ML: 600; 310; 30; 20 INJECTION, SOLUTION INTRAVENOUS at 10:28

## 2023-11-30 RX ADMIN — IOPAMIDOL 100 ML: 755 INJECTION, SOLUTION INTRAVENOUS at 10:58

## 2023-11-30 RX ADMIN — KETOROLAC TROMETHAMINE 15 MG: 30 INJECTION, SOLUTION INTRAMUSCULAR; INTRAVENOUS at 10:28

## 2023-11-30 RX ADMIN — DROPERIDOL 0.62 MG: 2.5 INJECTION, SOLUTION INTRAMUSCULAR; INTRAVENOUS at 10:28

## 2023-11-30 ASSESSMENT — PAIN SCALES - GENERAL: PAINLEVEL_OUTOF10: 9

## 2023-11-30 NOTE — ED PROVIDER NOTES
Fioricet  Take 1 capsule by mouth every 4 hours as needed for Headaches     ketorolac 10 MG tablet  Commonly known as: TORADOL  Take 1 tablet by mouth every 6 hours as needed for Pain     Kyleena IUD 19.5 mg  Generic drug: Levonorgestrel     lidocaine 5 %  Commonly known as: LIDODERM     ondansetron 4 MG tablet  Commonly known as: ZOFRAN  Take 1 tablet by mouth 3 times daily as needed for Nausea or Vomiting     traZODone 50 MG tablet  Commonly known as: DESYREL               Where to Get Your Medications        These medications were sent to [facility]/pharmacy #[id number]- [id number] [phone]  [facility]      Phone: [phone]   cephALEXin 500 MG capsule  ondansetron 4 MG disintegrating tablet           DISCONTINUED MEDICATIONS:  Current Discharge Medication List          I am the Primary Clinician of Record. Leon Ordonez MD (electronically signed)    (Please note that parts of this dictation were completed with voice recognition software. Quite often unanticipated grammatical, syntax, homophones, and other interpretive errors are inadvertently transcribed by the computer software. Please disregards these errors.  Please excuse any errors that have escaped final proofreading.)         Leon Ordonez MD  11/30/23 8802

## 2023-11-30 NOTE — ED NOTES
I have reviewed the provider's instructions with the patient, answering all questions to her satisfaction.  Pt ambulatory to waiting room        Emerson Johnson RN  11/30/23 4338

## 2023-12-01 LAB
BACTERIA SPEC CULT: NORMAL
SERVICE CMNT-IMP: NORMAL

## 2024-05-15 ENCOUNTER — HOSPITAL ENCOUNTER (EMERGENCY)
Facility: HOSPITAL | Age: 24
Discharge: HOME | End: 2024-05-15
Attending: EMERGENCY MEDICINE
Payer: COMMERCIAL

## 2024-05-15 ENCOUNTER — APPOINTMENT (OUTPATIENT)
Facility: HOSPITAL | Age: 24
End: 2024-05-15
Payer: COMMERCIAL

## 2024-05-15 VITALS
OXYGEN SATURATION: 96 % | DIASTOLIC BLOOD PRESSURE: 74 MMHG | HEART RATE: 92 BPM | WEIGHT: 147.71 LBS | TEMPERATURE: 98.3 F | HEIGHT: 62 IN | BODY MASS INDEX: 27.18 KG/M2 | SYSTOLIC BLOOD PRESSURE: 117 MMHG | RESPIRATION RATE: 20 BRPM

## 2024-05-15 DIAGNOSIS — R10.9 ACUTE LEFT FLANK PAIN: ICD-10-CM

## 2024-05-15 DIAGNOSIS — N30.00 ACUTE CYSTITIS WITHOUT HEMATURIA: Primary | ICD-10-CM

## 2024-05-15 LAB
APPEARANCE UR: CLEAR
BACTERIA URNS QL MICRO: ABNORMAL /HPF
BILIRUB UR QL: NEGATIVE
COLOR UR: ABNORMAL
EPITH CASTS URNS QL MICRO: ABNORMAL /LPF
GLUCOSE UR STRIP.AUTO-MCNC: NEGATIVE MG/DL
HCG UR QL: NEGATIVE
HGB UR QL STRIP: NEGATIVE
HYALINE CASTS URNS QL MICRO: ABNORMAL /LPF (ref 0–2)
KETONES UR QL STRIP.AUTO: NEGATIVE MG/DL
LEUKOCYTE ESTERASE UR QL STRIP.AUTO: ABNORMAL
NITRITE UR QL STRIP.AUTO: NEGATIVE
PH UR STRIP: 6 (ref 5–8)
PROT UR STRIP-MCNC: NEGATIVE MG/DL
RBC #/AREA URNS HPF: ABNORMAL /HPF (ref 0–5)
SP GR UR REFRACTOMETRY: 1.03
URINE CULTURE IF INDICATED: ABNORMAL
UROBILINOGEN UR QL STRIP.AUTO: 1 EU/DL (ref 0.2–1)
WBC URNS QL MICRO: ABNORMAL /HPF (ref 0–4)

## 2024-05-15 PROCEDURE — 81025 URINE PREGNANCY TEST: CPT

## 2024-05-15 PROCEDURE — 87086 URINE CULTURE/COLONY COUNT: CPT

## 2024-05-15 PROCEDURE — 96374 THER/PROPH/DIAG INJ IV PUSH: CPT

## 2024-05-15 PROCEDURE — 99284 EMERGENCY DEPT VISIT MOD MDM: CPT

## 2024-05-15 PROCEDURE — 6360000002 HC RX W HCPCS: Performed by: EMERGENCY MEDICINE

## 2024-05-15 PROCEDURE — 74176 CT ABD & PELVIS W/O CONTRAST: CPT

## 2024-05-15 PROCEDURE — 6370000000 HC RX 637 (ALT 250 FOR IP): Performed by: EMERGENCY MEDICINE

## 2024-05-15 PROCEDURE — 81001 URINALYSIS AUTO W/SCOPE: CPT

## 2024-05-15 RX ORDER — CEPHALEXIN 250 MG/1
500 CAPSULE ORAL
Status: COMPLETED | OUTPATIENT
Start: 2024-05-15 | End: 2024-05-15

## 2024-05-15 RX ORDER — NAPROXEN 500 MG/1
500 TABLET ORAL 2 TIMES DAILY
Qty: 60 TABLET | Refills: 0 | Status: SHIPPED | OUTPATIENT
Start: 2024-05-15

## 2024-05-15 RX ORDER — CEPHALEXIN 500 MG/1
500 CAPSULE ORAL 3 TIMES DAILY
Qty: 30 CAPSULE | Refills: 0 | Status: SHIPPED | OUTPATIENT
Start: 2024-05-15 | End: 2024-05-25

## 2024-05-15 RX ORDER — KETOROLAC TROMETHAMINE 30 MG/ML
15 INJECTION, SOLUTION INTRAMUSCULAR; INTRAVENOUS
Status: COMPLETED | OUTPATIENT
Start: 2024-05-15 | End: 2024-05-15

## 2024-05-15 RX ADMIN — CEPHALEXIN 500 MG: 250 CAPSULE ORAL at 03:22

## 2024-05-15 RX ADMIN — KETOROLAC TROMETHAMINE 15 MG: 30 INJECTION, SOLUTION INTRAMUSCULAR at 02:03

## 2024-05-15 ASSESSMENT — ENCOUNTER SYMPTOMS
DIARRHEA: 0
NAUSEA: 0
SHORTNESS OF BREATH: 0
VOMITING: 0
ABDOMINAL PAIN: 1

## 2024-05-15 ASSESSMENT — PAIN DESCRIPTION - DESCRIPTORS: DESCRIPTORS: SHARP;STABBING

## 2024-05-15 ASSESSMENT — LIFESTYLE VARIABLES
HOW OFTEN DO YOU HAVE A DRINK CONTAINING ALCOHOL: NEVER
HOW MANY STANDARD DRINKS CONTAINING ALCOHOL DO YOU HAVE ON A TYPICAL DAY: PATIENT DOES NOT DRINK

## 2024-05-15 ASSESSMENT — PAIN DESCRIPTION - PAIN TYPE: TYPE: ACUTE PAIN

## 2024-05-15 ASSESSMENT — PAIN SCALES - GENERAL: PAINLEVEL_OUTOF10: 8

## 2024-05-15 ASSESSMENT — PAIN DESCRIPTION - FREQUENCY: FREQUENCY: CONTINUOUS

## 2024-05-15 ASSESSMENT — PAIN DESCRIPTION - LOCATION: LOCATION: ABDOMEN

## 2024-05-15 ASSESSMENT — PAIN - FUNCTIONAL ASSESSMENT: PAIN_FUNCTIONAL_ASSESSMENT: 0-10

## 2024-05-15 ASSESSMENT — PAIN DESCRIPTION - ORIENTATION: ORIENTATION: LEFT;LOWER

## 2024-05-15 NOTE — DISCHARGE INSTRUCTIONS
It was a pleasure taking care of you in our Emergency Department today.  We know that when you come to [facility], you are entrusting us with your health, comfort, and safety.  Our physicians and nurses honor that trust, and truly appreciate the opportunity to care for you and your loved ones.      We also value your feedback.  If you receive a survey about your Emergency Department experience today, please fill it out.  We care about our patients' feedback, and we listen to what you have to say.  Thank you!      Dr. Olinda Das MD.

## 2024-05-15 NOTE — ED PROVIDER NOTES
home with supportive care as discussed.  Patient acknowledges understanding and is in agreement with plan for discharge at this time.   CRITICAL CARE TIME      None     SCREENINGS AND COUNSELING     TOBACCO COUNSELING:  During evaluation pt reported that they are a current tobacco user.    I have spent 3 minutes discussing the medical risks of prolonged smoking habits and advised the patient of the benefits of the cessation of smoking, providing specific suggestions on how to quit.     Pt has been counseled and encouraged to quit as soon as possible in order to decrease further risks to their health. Pt has conveyed their understanding of the risks involved should they continue to use tobacco products.    SOCIAL DETERMINATES OF HEALTH AFFECTING DX OR TX     Depression or Chronic Psychiatric Condition    FINAL IMPRESSION     1. Acute cystitis without hematuria    2. Acute left flank pain         DISPOSITION/PLAN     Discharge to home     Nolvia Ruiz's  results have been reviewed with her.  She has been counseled regarding her diagnosis, treatment, and plan.  She verbally conveys understanding and agreement of the signs, symptoms, diagnosis, treatment and prognosis and additionally agrees to follow up as discussed.  She also agrees with the care-plan.  I believe that all of her questions have been answered.  I have also provided discharge instructions for her that include: educational information regarding their diagnosis and treatment, and list of reasons why they would want to return to the ED prior to their follow-up appointment, should her condition change.     PATIENT REFERRED TO:  Radha Bettencourt, APRN - NP  [address] [facility]  [address]  [facility] [id number] [phone]    In 2 days      [facility] EMERGENCY DEPT  [address] [facility] [address]  [phone]    If symptoms worsen        DISCHARGE MEDICATIONS:     Medication List        START taking these medications      cephALEXin 500 MG

## 2024-05-16 LAB
BACTERIA SPEC CULT: NORMAL
SERVICE CMNT-IMP: NORMAL

## 2024-08-14 ENCOUNTER — HOSPITAL ENCOUNTER (EMERGENCY)
Facility: HOSPITAL | Age: 24
Discharge: HOME | End: 2024-08-14
Attending: STUDENT IN AN ORGANIZED HEALTH CARE EDUCATION/TRAINING PROGRAM
Payer: COMMERCIAL

## 2024-08-14 ENCOUNTER — APPOINTMENT (OUTPATIENT)
Facility: HOSPITAL | Age: 24
End: 2024-08-14
Payer: COMMERCIAL

## 2024-08-14 VITALS
HEART RATE: 53 BPM | OXYGEN SATURATION: 98 % | TEMPERATURE: 98.4 F | WEIGHT: 145.5 LBS | BODY MASS INDEX: 26.78 KG/M2 | RESPIRATION RATE: 17 BRPM | DIASTOLIC BLOOD PRESSURE: 61 MMHG | SYSTOLIC BLOOD PRESSURE: 101 MMHG | HEIGHT: 62 IN

## 2024-08-14 DIAGNOSIS — N30.01 ACUTE CYSTITIS WITH HEMATURIA: Primary | ICD-10-CM

## 2024-08-14 DIAGNOSIS — R10.30 LOWER ABDOMINAL PAIN: ICD-10-CM

## 2024-08-14 LAB
ALBUMIN SERPL-MCNC: 3.2 G/DL (ref 3.5–5)
ALBUMIN/GLOB SERPL: 0.8 (ref 1.1–2.2)
ALP SERPL-CCNC: 89 U/L (ref 45–117)
ALT SERPL-CCNC: 21 U/L (ref 12–78)
ANION GAP SERPL CALC-SCNC: 7 MMOL/L (ref 5–15)
APPEARANCE UR: ABNORMAL
AST SERPL-CCNC: 9 U/L (ref 15–37)
BACTERIA URNS QL MICRO: ABNORMAL /HPF
BASOPHILS # BLD: 0 K/UL (ref 0–0.1)
BASOPHILS NFR BLD: 0 % (ref 0–1)
BILIRUB SERPL-MCNC: 0.3 MG/DL (ref 0.2–1)
BILIRUB UR QL: NEGATIVE
BUN SERPL-MCNC: 11 MG/DL (ref 6–20)
BUN/CREAT SERPL: 23 (ref 12–20)
CALCIUM SERPL-MCNC: 8.7 MG/DL (ref 8.5–10.1)
CHLORIDE SERPL-SCNC: 106 MMOL/L (ref 97–108)
CO2 SERPL-SCNC: 24 MMOL/L (ref 21–32)
COLOR UR: ABNORMAL
CREAT SERPL-MCNC: 0.47 MG/DL (ref 0.55–1.02)
DIFFERENTIAL METHOD BLD: ABNORMAL
EKG ATRIAL RATE: 66 BPM
EKG DIAGNOSIS: NORMAL
EKG P AXIS: 28 DEGREES
EKG P-R INTERVAL: 162 MS
EKG Q-T INTERVAL: 402 MS
EKG QRS DURATION: 78 MS
EKG QTC CALCULATION (BAZETT): 421 MS
EKG R AXIS: 47 DEGREES
EKG T AXIS: 31 DEGREES
EKG VENTRICULAR RATE: 66 BPM
EOSINOPHIL # BLD: 0.2 K/UL (ref 0–0.4)
EOSINOPHIL NFR BLD: 2 % (ref 0–7)
EPITH CASTS URNS QL MICRO: ABNORMAL /LPF
ERYTHROCYTE [DISTWIDTH] IN BLOOD BY AUTOMATED COUNT: 11.6 % (ref 11.5–14.5)
GLOBULIN SER CALC-MCNC: 3.8 G/DL (ref 2–4)
GLUCOSE SERPL-MCNC: 85 MG/DL (ref 65–100)
GLUCOSE UR STRIP.AUTO-MCNC: NEGATIVE MG/DL
HCG UR QL: NEGATIVE
HCT VFR BLD AUTO: 36.9 % (ref 35–47)
HGB BLD-MCNC: 12.3 G/DL (ref 11.5–16)
HGB UR QL STRIP: NEGATIVE
IMM GRANULOCYTES # BLD AUTO: 0 K/UL (ref 0–0.04)
IMM GRANULOCYTES NFR BLD AUTO: 0 % (ref 0–0.5)
KETONES UR QL STRIP.AUTO: NEGATIVE MG/DL
LEUKOCYTE ESTERASE UR QL STRIP.AUTO: ABNORMAL
LIPASE SERPL-CCNC: 28 U/L (ref 13–75)
LYMPHOCYTES # BLD: 3.8 K/UL (ref 0.8–3.5)
LYMPHOCYTES NFR BLD: 39 % (ref 12–49)
MCH RBC QN AUTO: 29.5 PG (ref 26–34)
MCHC RBC AUTO-ENTMCNC: 33.3 G/DL (ref 30–36.5)
MCV RBC AUTO: 88.5 FL (ref 80–99)
MONOCYTES # BLD: 0.8 K/UL (ref 0–1)
MONOCYTES NFR BLD: 8 % (ref 5–13)
NEUTS SEG # BLD: 4.8 K/UL (ref 1.8–8)
NEUTS SEG NFR BLD: 51 % (ref 32–75)
NITRITE UR QL STRIP.AUTO: NEGATIVE
NRBC # BLD: 0 K/UL (ref 0–0.01)
NRBC BLD-RTO: 0 PER 100 WBC
PH UR STRIP: 5.5 (ref 5–8)
PLATELET # BLD AUTO: 243 K/UL (ref 150–400)
PMV BLD AUTO: 9.7 FL (ref 8.9–12.9)
POTASSIUM SERPL-SCNC: 3.5 MMOL/L (ref 3.5–5.1)
PROT SERPL-MCNC: 7 G/DL (ref 6.4–8.2)
PROT UR STRIP-MCNC: ABNORMAL MG/DL
RBC # BLD AUTO: 4.17 M/UL (ref 3.8–5.2)
RBC #/AREA URNS HPF: ABNORMAL /HPF (ref 0–5)
SODIUM SERPL-SCNC: 137 MMOL/L (ref 136–145)
SP GR UR REFRACTOMETRY: 1.03
URINE CULTURE IF INDICATED: ABNORMAL
UROBILINOGEN UR QL STRIP.AUTO: 1 EU/DL (ref 0.2–1)
WBC # BLD AUTO: 9.6 K/UL (ref 3.6–11)
WBC URNS QL MICRO: ABNORMAL /HPF (ref 0–4)

## 2024-08-14 PROCEDURE — 85025 COMPLETE CBC W/AUTO DIFF WBC: CPT

## 2024-08-14 PROCEDURE — 81001 URINALYSIS AUTO W/SCOPE: CPT

## 2024-08-14 PROCEDURE — 81025 URINE PREGNANCY TEST: CPT

## 2024-08-14 PROCEDURE — 87086 URINE CULTURE/COLONY COUNT: CPT

## 2024-08-14 PROCEDURE — 80053 COMPREHEN METABOLIC PANEL: CPT

## 2024-08-14 PROCEDURE — 74177 CT ABD & PELVIS W/CONTRAST: CPT

## 2024-08-14 PROCEDURE — 99285 EMERGENCY DEPT VISIT HI MDM: CPT

## 2024-08-14 PROCEDURE — 36415 COLL VENOUS BLD VENIPUNCTURE: CPT

## 2024-08-14 PROCEDURE — 6360000004 HC RX CONTRAST MEDICATION: Performed by: STUDENT IN AN ORGANIZED HEALTH CARE EDUCATION/TRAINING PROGRAM

## 2024-08-14 PROCEDURE — 83690 ASSAY OF LIPASE: CPT

## 2024-08-14 PROCEDURE — 93005 ELECTROCARDIOGRAM TRACING: CPT | Performed by: STUDENT IN AN ORGANIZED HEALTH CARE EDUCATION/TRAINING PROGRAM

## 2024-08-14 RX ORDER — ACETAMINOPHEN 500 MG
1000 TABLET ORAL 3 TIMES DAILY PRN
Qty: 100 TABLET | Refills: 0 | Status: SHIPPED | OUTPATIENT
Start: 2024-08-14

## 2024-08-14 RX ORDER — NITROFURANTOIN 25; 75 MG/1; MG/1
100 CAPSULE ORAL 2 TIMES DAILY
Qty: 20 CAPSULE | Refills: 0 | Status: SHIPPED | OUTPATIENT
Start: 2024-08-14 | End: 2024-08-24

## 2024-08-14 RX ORDER — KETOROLAC TROMETHAMINE 10 MG/1
10 TABLET, FILM COATED ORAL 3 TIMES DAILY PRN
Qty: 20 TABLET | Refills: 0 | Status: SHIPPED | OUTPATIENT
Start: 2024-08-14

## 2024-08-14 RX ADMIN — IOPAMIDOL 100 ML: 755 INJECTION, SOLUTION INTRAVENOUS at 09:58

## 2024-08-14 ASSESSMENT — PAIN - FUNCTIONAL ASSESSMENT: PAIN_FUNCTIONAL_ASSESSMENT: 0-10

## 2024-08-14 ASSESSMENT — PAIN SCALES - GENERAL
PAINLEVEL_OUTOF10: 8
PAINLEVEL_OUTOF10: 8

## 2024-08-14 ASSESSMENT — PAIN DESCRIPTION - LOCATION: LOCATION: ABDOMEN

## 2024-08-14 NOTE — ED PROVIDER NOTES
\[facility]\"" EMERGENCY DEPT  EMERGENCY DEPARTMENT ENCOUNTER       Pt Name: Nolvia Ruiz  MRN: [id number]  Birthdate 2000  Date of evaluation: 8/14/2024  Provider: Laoy Mcneal MD   PCP: Radha Bettencourt APRN - NP  Note Started: 11:39 AM EDT 8/14/24     CHIEF COMPLAINT       Chief Complaint   Patient presents with    Abdominal Pain     Pt reports she was arrested Saturday night and was shackled with waist chain on and was not buckled in the back of the  car and got \"tossed around\". Pt reports lower abdominal pain and \"bricks\" on her chest. Pt also reports sob upon standing. Abdominal surgery hx of gallbladder removal        HISTORY OF PRESENT ILLNESS: 1 or more elements      History From: Patient  HPI Limitations: None     Nolvia Ruiz is a 23 y.o. female who presents for complaints of lower abdominal pain.  She reports that she is also having dysuria and would like to be checked for UTI.  Patient reports that her pain began after she was arrested by the police this past weekend.  She reports that they had applied a restraint belt to her shackles when she was arrested.  She states that she is having pain along the area where the belt was placed.  Denies bruising or swelling.  Denies, diarrhea.  Denies fever, chills.  Denies back pain.  Denies flank pain.  No medications taken prior to arrival.     Nursing Notes were all reviewed and agreed with or any disagreements were addressed in the HPI.     REVIEW OF SYSTEMS      Review of Systems     Positives and Pertinent negatives as per HPI.    PAST HISTORY     Past Medical History:  Past Medical History:   Diagnosis Date    ADHD (attention deficit hyperactivity disorder)     Asthma     exercise induced    Bipolar affective (HCC)     Borderline personality disorder (HCC)     GERD (gastroesophageal reflux disease)     Nicotine vapor product user     Psychiatric disorder     anxiety, depression, SI's, bipolar depression    Suicidal thoughts          Past Surgical  inhaler  Commonly known as: PROVENTIL;VENTOLIN;PROAIR     butalbital-APAP-caffeine -40 MG Caps per capsule  Commonly known as: Fioricet  Take 1 capsule by mouth every 4 hours as needed for Headaches     Kyleena IUD 19.5 mg  Generic drug: Levonorgestrel     lidocaine 5 %  Commonly known as: LIDODERM     naproxen 500 MG tablet  Commonly known as: Naprosyn  Take 1 tablet by mouth 2 times daily     ondansetron 4 MG disintegrating tablet  Commonly known as: ZOFRAN-ODT  Take 1 tablet by mouth 3 times daily as needed for Nausea or Vomiting     ondansetron 4 MG tablet  Commonly known as: ZOFRAN  Take 1 tablet by mouth 3 times daily as needed for Nausea or Vomiting     traZODone 50 MG tablet  Commonly known as: DESYREL               Where to Get Your Medications        These medications were sent to [facility]/pharmacy #[id number] [place], [place] - [address] - P [phone] - F [phone] [address] [address]      Phone: [phone]   acetaminophen 500 MG tablet  ketorolac 10 MG tablet  nitrofurantoin (macrocrystal-monohydrate) 100 MG capsule           DISCONTINUED MEDICATIONS:  Current Discharge Medication List          I am the Primary Clinician of Record.   Layo Mcneal MD (electronically signed)      (Please note that parts of this dictation were completed with voice recognition software. Quite often unanticipated grammatical, syntax, homophones, and other interpretive errors are inadvertently transcribed by the computer software. Please disregards these errors. Please excuse any errors that have escaped final proofreading.)         Layo Mcneal MD  08/18/24 [id number]

## 2024-08-14 NOTE — ED TRIAGE NOTES
Pt reports she was arrested Saturday night and was shackled with waist chain on and was not buckled in the back of the  car and got \"tossed around\". Pt reports lower abdominal pain and \"bricks\" on her chest. Pt also reports sob upon standing. Abdominal surgery hx of gallbladder removal

## 2024-08-14 NOTE — ED NOTES
Discharge paperwork reviewed and provided to pt and pt mother. Time was given to ask any questions or concerns which there were none att.

## 2024-08-15 LAB
BACTERIA SPEC CULT: NORMAL
SERVICE CMNT-IMP: NORMAL

## 2024-09-17 ENCOUNTER — HOSPITAL ENCOUNTER (EMERGENCY)
Facility: HOSPITAL | Age: 24
Discharge: LEFT BEFORE DISCHARGE | End: 2024-09-18
Attending: STUDENT IN AN ORGANIZED HEALTH CARE EDUCATION/TRAINING PROGRAM
Payer: COMMERCIAL

## 2024-09-17 VITALS
DIASTOLIC BLOOD PRESSURE: 68 MMHG | HEART RATE: 67 BPM | TEMPERATURE: 98.6 F | OXYGEN SATURATION: 96 % | RESPIRATION RATE: 18 BRPM | SYSTOLIC BLOOD PRESSURE: 107 MMHG

## 2024-09-17 DIAGNOSIS — T65.94XA INGESTION OF SUBSTANCE, UNDETERMINED INTENT, INITIAL ENCOUNTER: Primary | ICD-10-CM

## 2024-09-17 DIAGNOSIS — F60.3 BORDERLINE PERSONALITY DISORDER (HCC): ICD-10-CM

## 2024-09-17 DIAGNOSIS — N39.0 URINARY TRACT INFECTION WITHOUT HEMATURIA, SITE UNSPECIFIED: ICD-10-CM

## 2024-09-17 LAB
AMPHET UR QL SCN: NEGATIVE
APAP SERPL-MCNC: <2 UG/ML (ref 10–30)
APPEARANCE UR: CLEAR
BACTERIA URNS QL MICRO: ABNORMAL /HPF
BARBITURATES UR QL SCN: NEGATIVE
BASOPHILS # BLD: 0 K/UL (ref 0–0.1)
BASOPHILS NFR BLD: 0 % (ref 0–1)
BENZODIAZ UR QL: NEGATIVE
BILIRUB UR QL: NEGATIVE
CANNABINOIDS UR QL SCN: NEGATIVE
COCAINE UR QL SCN: NEGATIVE
COLOR UR: ABNORMAL
DIFFERENTIAL METHOD BLD: ABNORMAL
EOSINOPHIL # BLD: 0.1 K/UL (ref 0–0.4)
EOSINOPHIL NFR BLD: 1 % (ref 0–7)
EPITH CASTS URNS QL MICRO: ABNORMAL /LPF
ERYTHROCYTE [DISTWIDTH] IN BLOOD BY AUTOMATED COUNT: 11.5 % (ref 11.5–14.5)
ETHANOL SERPL-MCNC: <10 MG/DL (ref 0–0.08)
GLUCOSE UR STRIP.AUTO-MCNC: NEGATIVE MG/DL
HCG SERPL-ACNC: <1 MIU/ML (ref 0–6)
HCT VFR BLD AUTO: 40.2 % (ref 35–47)
HGB BLD-MCNC: 13.1 G/DL (ref 11.5–16)
HGB UR QL STRIP: NEGATIVE
HYALINE CASTS URNS QL MICRO: ABNORMAL /LPF (ref 0–2)
IMM GRANULOCYTES # BLD AUTO: 0 K/UL (ref 0–0.04)
IMM GRANULOCYTES NFR BLD AUTO: 0 % (ref 0–0.5)
KETONES UR QL STRIP.AUTO: NEGATIVE MG/DL
LACTATE BLD-SCNC: 1.2 MMOL/L (ref 0.4–2)
LEUKOCYTE ESTERASE UR QL STRIP.AUTO: ABNORMAL
LYMPHOCYTES # BLD: 4 K/UL (ref 0.8–3.5)
LYMPHOCYTES NFR BLD: 37 % (ref 12–49)
Lab: NORMAL
MCH RBC QN AUTO: 29 PG (ref 26–34)
MCHC RBC AUTO-ENTMCNC: 32.6 G/DL (ref 30–36.5)
MCV RBC AUTO: 89.1 FL (ref 80–99)
METHADONE UR QL: NEGATIVE
MONOCYTES # BLD: 0.9 K/UL (ref 0–1)
MONOCYTES NFR BLD: 8 % (ref 5–13)
NEUTS SEG # BLD: 5.8 K/UL (ref 1.8–8)
NEUTS SEG NFR BLD: 54 % (ref 32–75)
NITRITE UR QL STRIP.AUTO: NEGATIVE
NRBC # BLD: 0 K/UL (ref 0–0.01)
NRBC BLD-RTO: 0 PER 100 WBC
OPIATES UR QL: NEGATIVE
PCP UR QL: NEGATIVE
PH UR STRIP: 7 (ref 5–8)
PLATELET # BLD AUTO: 325 K/UL (ref 150–400)
PMV BLD AUTO: 9.6 FL (ref 8.9–12.9)
PROT UR STRIP-MCNC: NEGATIVE MG/DL
RBC # BLD AUTO: 4.51 M/UL (ref 3.8–5.2)
RBC #/AREA URNS HPF: ABNORMAL /HPF (ref 0–5)
SALICYLATES SERPL-MCNC: <1.7 MG/DL (ref 2.8–20)
SP GR UR REFRACTOMETRY: 1.01
UROBILINOGEN UR QL STRIP.AUTO: 0.2 EU/DL (ref 0.2–1)
WBC # BLD AUTO: 10.9 K/UL (ref 3.6–11)
WBC URNS QL MICRO: ABNORMAL /HPF (ref 0–4)

## 2024-09-17 PROCEDURE — 80143 DRUG ASSAY ACETAMINOPHEN: CPT

## 2024-09-17 PROCEDURE — 82077 ASSAY SPEC XCP UR&BREATH IA: CPT

## 2024-09-17 PROCEDURE — 85025 COMPLETE CBC W/AUTO DIFF WBC: CPT

## 2024-09-17 PROCEDURE — 99284 EMERGENCY DEPT VISIT MOD MDM: CPT

## 2024-09-17 PROCEDURE — 80307 DRUG TEST PRSMV CHEM ANLYZR: CPT

## 2024-09-17 PROCEDURE — 84702 CHORIONIC GONADOTROPIN TEST: CPT

## 2024-09-17 PROCEDURE — 80179 DRUG ASSAY SALICYLATE: CPT

## 2024-09-17 PROCEDURE — 80053 COMPREHEN METABOLIC PANEL: CPT

## 2024-09-17 PROCEDURE — 36415 COLL VENOUS BLD VENIPUNCTURE: CPT

## 2024-09-17 PROCEDURE — 81001 URINALYSIS AUTO W/SCOPE: CPT

## 2024-09-17 PROCEDURE — 83605 ASSAY OF LACTIC ACID: CPT

## 2024-09-17 ASSESSMENT — PAIN SCALES - GENERAL: PAINLEVEL_OUTOF10: 0

## 2024-09-17 ASSESSMENT — PAIN - FUNCTIONAL ASSESSMENT: PAIN_FUNCTIONAL_ASSESSMENT: 0-10

## 2024-09-18 LAB
ALBUMIN SERPL-MCNC: 3.5 G/DL (ref 3.5–5)
ALBUMIN/GLOB SERPL: 0.9 (ref 1.1–2.2)
ALP SERPL-CCNC: 103 U/L (ref 45–117)
ALT SERPL-CCNC: 41 U/L (ref 12–78)
ANION GAP SERPL CALC-SCNC: 7 MMOL/L (ref 2–12)
AST SERPL-CCNC: 21 U/L (ref 15–37)
BILIRUB SERPL-MCNC: 0.3 MG/DL (ref 0.2–1)
BUN SERPL-MCNC: 10 MG/DL (ref 6–20)
BUN/CREAT SERPL: 19 (ref 12–20)
CALCIUM SERPL-MCNC: 9 MG/DL (ref 8.5–10.1)
CHLORIDE SERPL-SCNC: 107 MMOL/L (ref 97–108)
CO2 SERPL-SCNC: 24 MMOL/L (ref 21–32)
CREAT SERPL-MCNC: 0.53 MG/DL (ref 0.55–1.02)
EKG ATRIAL RATE: 62 BPM
EKG DIAGNOSIS: NORMAL
EKG P AXIS: 57 DEGREES
EKG P-R INTERVAL: 158 MS
EKG Q-T INTERVAL: 388 MS
EKG QRS DURATION: 86 MS
EKG QTC CALCULATION (BAZETT): 393 MS
EKG R AXIS: 96 DEGREES
EKG T AXIS: 53 DEGREES
EKG VENTRICULAR RATE: 62 BPM
GLOBULIN SER CALC-MCNC: 4.1 G/DL (ref 2–4)
GLUCOSE SERPL-MCNC: 95 MG/DL (ref 65–100)
POTASSIUM SERPL-SCNC: 3.5 MMOL/L (ref 3.5–5.1)
PROT SERPL-MCNC: 7.6 G/DL (ref 6.4–8.2)
SODIUM SERPL-SCNC: 138 MMOL/L (ref 136–145)

## 2024-09-18 PROCEDURE — 2580000003 HC RX 258: Performed by: STUDENT IN AN ORGANIZED HEALTH CARE EDUCATION/TRAINING PROGRAM

## 2024-09-18 PROCEDURE — 96365 THER/PROPH/DIAG IV INF INIT: CPT

## 2024-09-18 PROCEDURE — 87086 URINE CULTURE/COLONY COUNT: CPT

## 2024-09-18 PROCEDURE — 6360000002 HC RX W HCPCS: Performed by: STUDENT IN AN ORGANIZED HEALTH CARE EDUCATION/TRAINING PROGRAM

## 2024-09-18 RX ORDER — NITROFURANTOIN 25; 75 MG/1; MG/1
100 CAPSULE ORAL 2 TIMES DAILY
Qty: 10 CAPSULE | Refills: 0 | Status: SHIPPED | OUTPATIENT
Start: 2024-09-18 | End: 2024-09-23

## 2024-09-18 RX ADMIN — SODIUM CHLORIDE 1000 MG: 900 INJECTION INTRAVENOUS at 00:01

## 2024-09-19 LAB
BACTERIA SPEC CULT: NORMAL
SERVICE CMNT-IMP: NORMAL

## 2025-03-31 ENCOUNTER — HOSPITAL ENCOUNTER (EMERGENCY)
Facility: HOSPITAL | Age: 25
Discharge: HOME | End: 2025-03-31
Attending: STUDENT IN AN ORGANIZED HEALTH CARE EDUCATION/TRAINING PROGRAM
Payer: COMMERCIAL

## 2025-03-31 VITALS
RESPIRATION RATE: 20 BRPM | TEMPERATURE: 98.8 F | SYSTOLIC BLOOD PRESSURE: 120 MMHG | OXYGEN SATURATION: 99 % | BODY MASS INDEX: 25.4 KG/M2 | WEIGHT: 138.01 LBS | HEIGHT: 62 IN | HEART RATE: 65 BPM | DIASTOLIC BLOOD PRESSURE: 88 MMHG

## 2025-03-31 DIAGNOSIS — H11.89 CONJUNCTIVAL IRRITATION: Primary | ICD-10-CM

## 2025-03-31 PROCEDURE — 99283 EMERGENCY DEPT VISIT LOW MDM: CPT

## 2025-03-31 PROCEDURE — 93005 ELECTROCARDIOGRAM TRACING: CPT | Performed by: STUDENT IN AN ORGANIZED HEALTH CARE EDUCATION/TRAINING PROGRAM

## 2025-03-31 PROCEDURE — 6370000000 HC RX 637 (ALT 250 FOR IP): Performed by: STUDENT IN AN ORGANIZED HEALTH CARE EDUCATION/TRAINING PROGRAM

## 2025-03-31 RX ORDER — DIPHENHYDRAMINE HCL 25 MG
1 CAPSULE ORAL EVERY 4 HOURS PRN
Qty: 15 ML | Refills: 0 | Status: SHIPPED | OUTPATIENT
Start: 2025-03-31

## 2025-03-31 RX ORDER — TETRACAINE HYDROCHLORIDE 5 MG/ML
1 SOLUTION OPHTHALMIC
Status: COMPLETED | OUTPATIENT
Start: 2025-03-31 | End: 2025-03-31

## 2025-03-31 RX ADMIN — FLUORESCEIN SODIUM 1 MG: 1 STRIP OPHTHALMIC at 15:09

## 2025-03-31 RX ADMIN — TETRACAINE HYDROCHLORIDE 1 DROP: 5 SOLUTION OPHTHALMIC at 15:09

## 2025-03-31 NOTE — ED PROVIDER NOTES
Disposition:  Home  {Nolvia Ruiz's  results have been reviewed with her.  She has been counseled regarding her diagnosis, treatment, and plan.  She verbally conveys understanding and agreement of the signs, symptoms, diagnosis, treatment and prognosis and additionally agrees to follow up as discussed.  She also agrees with the care-plan and conveys that all of her questions have been answered.  I have also provided discharge instructions for her that include: educational information regarding their diagnosis and treatment, and list of reasons why they would want to return to the ED prior to their follow-up appointment, should her condition change.      PLAN:  1.      Medication List        START taking these medications      artificial tears 0.1-0.3 % Soln ophthalmic solution  Generic drug: dextran 70-hypromellose  Place 1 drop into both eyes every 4 hours as needed (eye irritation)            ASK your doctor about these medications      acetaminophen 500 MG tablet  Commonly known as: TYLENOL  Take 2 tablets by mouth 3 times daily as needed for Pain     albuterol sulfate  (90 Base) MCG/ACT inhaler  Commonly known as: PROVENTIL;VENTOLIN;PROAIR     butalbital-APAP-caffeine -40 MG Caps per capsule  Commonly known as: Fioricet  Take 1 capsule by mouth every 4 hours as needed for Headaches     ketorolac 10 MG tablet  Commonly known as: TORADOL  Take 1 tablet by mouth 3 times daily as needed for Pain     Kyleena IUD 19.5 mg  Generic drug: Levonorgestrel     lidocaine 5 %  Commonly known as: LIDODERM     naproxen 500 MG tablet  Commonly known as: Naprosyn  Take 1 tablet by mouth 2 times daily     ondansetron 4 MG disintegrating tablet  Commonly known as: ZOFRAN-ODT  Take 1 tablet by mouth 3 times daily as needed for Nausea or Vomiting     ondansetron 4 MG tablet  Commonly known as: ZOFRAN  Take 1 tablet by mouth 3 times daily as needed for Nausea or Vomiting     traZODone 50 MG tablet  Commonly known

## 2025-04-03 LAB
EKG ATRIAL RATE: 85 BPM
EKG DIAGNOSIS: NORMAL
EKG P AXIS: 73 DEGREES
EKG P-R INTERVAL: 154 MS
EKG Q-T INTERVAL: 342 MS
EKG QRS DURATION: 88 MS
EKG QTC CALCULATION (BAZETT): 406 MS
EKG R AXIS: 87 DEGREES
EKG T AXIS: 44 DEGREES
EKG VENTRICULAR RATE: 85 BPM

## 2025-04-15 NOTE — PROGRESS NOTES
HISTORY OF PRESENT ILLNESS  Steven Walsh is a 26 y.o. female who comes in for consultation by Rain Combs MD for abdominal pain  HPI  She had developed abdominal pain and went to [facility] and they thought she had a GB issue and recommended further evaluation. She has had 4 months of RUQ pain radiating to the back after eating and nausea, vomiting, and diarrhea. She denies hematemesis, constipation, melena, or hematochezia, dysuria or hematuria. She has not had any testing or been to a GI doctor. Past Medical History:   Diagnosis Date    ADHD (attention deficit hyperactivity disorder)     Bipolar affective ([facility] [address]     Psychiatric disorder     mood disorder, adhd, bipolar, boarderline personality disoder    Suicidal thoughts      History reviewed. No pertinent surgical history. History reviewed. No pertinent family history. Social History     Tobacco Use    Smoking status: Never Smoker    Smokeless tobacco: Current User   Substance Use Topics    Alcohol use: No    Drug use: No     Current Outpatient Medications   Medication Sig    doxycycline (MONODOX) 100 mg capsule Take 100 mg by mouth two (2) times a day.  benzonatate (TESSALON) 100 mg capsule TK 1 C PO TID PRN    cetirizine (ZYRTEC) 10 mg tablet Take 10 mg by mouth.  metFORMIN ER (GLUCOPHAGE XR) 500 mg tablet Take 500 mg by mouth.  methylPREDNISolone (MEDROL DOSEPACK) 4 mg tablet FPD    ondansetron (ZOFRAN ODT) 4 mg disintegrating tablet DISSOLVE 1 T ON THE TONGUE TID PRN    Biotin 2,500 mcg cap Take  by mouth.  phenazopyridine (PYRIDIUM) 95 mg tab Take  by mouth.  lactase (LACTAID FAST ACT) 9,000 unit tablet Take 9,000 Units by mouth three (3) times daily (with meals).  EPINEPHrine (EPIPEN) 0.3 mg/0.3 mL injection 0.3 mg by IntraMUSCular route once as needed.  methylphenidate ER 36 mg 24 hr tab Take 36 mg by mouth every morning.  escitalopram oxalate (LEXAPRO) 20 mg tablet Take 20 mg by mouth daily.     Mr. Araya:    Your liver tests remain normal.    If you have any questions about your liver disease care or tests, you can call the clinic at [phone].     - Dr. Decker guanFACINE ER (INTUNIV) 3 mg ER tablet Take 3 mg by mouth daily.  lamoTRIgine (LAMICTAL) 200 mg tablet Take 200 mg by mouth nightly.  raNITIdine (ZANTAC) 150 mg tablet Take 150 mg by mouth two (2) times a day.  melatonin 5 mg tablet Take 10 mg by mouth nightly.  clonazePAM (KLONOPIN) 0.5 mg tablet Take 0.25 mg by mouth two (2) times a day. 1/2 tab    norgestimate-ethinyl estradiol (TRI-ADELINE) 0.18/0.215/0.25 mg-35 mcg (28) tab Take 1 Tab by mouth daily.  ARIPiprazole (ABILIFY) 15 mg tablet Take 15 mg by mouth daily. No current facility-administered medications for this visit. No Known Allergies    Review of Systems   Constitutional: Positive for malaise/fatigue. Negative for chills, diaphoresis, fever and weight loss. HENT: Positive for nosebleeds. Negative for sore throat. Eyes: Negative for blurred vision and discharge. Respiratory: Positive for cough. Negative for shortness of breath and wheezing. Cardiovascular: Negative for chest pain, palpitations, orthopnea, claudication and leg swelling. Gastrointestinal: Positive for abdominal pain, diarrhea, nausea and vomiting. Negative for constipation, heartburn and melena. Genitourinary: Negative for dysuria, flank pain, frequency and hematuria. Musculoskeletal: Negative for back pain, joint pain, myalgias and neck pain. Skin: Negative for rash. Neurological: Positive for headaches. Negative for dizziness, speech change, focal weakness, seizures, loss of consciousness and weakness. Endo/Heme/Allergies: Bruises/bleeds easily. Psychiatric/Behavioral: Positive for depression. Negative for memory loss. The patient is nervous/anxious.       Visit Vitals  /54 (BP 1 Location: Right arm, BP Patient Position: Sitting)   Pulse 105   Temp 96.7 °F (35.9 °C) (Oral)   Resp 18   Ht 5' 4\" (1.626 m)   Wt 75.1 kg (165 lb 8 oz)   SpO2 98%   BMI 28.41 kg/m²       Physical Exam  Constitutional:       General: She is not in acute distress. Appearance: She is well-developed. She is not diaphoretic. HENT:      Head: Normocephalic and atraumatic. Mouth/Throat:      Pharynx: No oropharyngeal exudate. Eyes:      General: No scleral icterus. Conjunctiva/sclera: Conjunctivae normal.      Pupils: Pupils are equal, round, and reactive to light. Neck:      Musculoskeletal: Normal range of motion and neck supple. Thyroid: No thyromegaly. Vascular: No JVD. Trachea: No tracheal deviation. Cardiovascular:      Rate and Rhythm: Normal rate and regular rhythm. Heart sounds: No murmur. No friction rub. No gallop. Pulmonary:      Effort: Pulmonary effort is normal. No respiratory distress. Breath sounds: Normal breath sounds. No wheezing or rales. Abdominal:      General: Bowel sounds are normal. There is no distension. Palpations: Abdomen is soft. There is no mass. Tenderness: There is tenderness (mild) in the right upper quadrant. There is no guarding or rebound. Hernia: There is no hernia in the umbilical area or ventral area. Musculoskeletal: Normal range of motion. Lymphadenopathy:      Cervical: No cervical adenopathy. Skin:     General: Skin is warm and dry. Coloration: Skin is not pale. Findings: No erythema or rash. Neurological:      Mental Status: She is alert and oriented to person, place, and time. Cranial Nerves: No cranial nerve deficit. Psychiatric:         Behavior: Behavior normal.         Thought Content: Thought content normal.         Judgment: Judgment normal.         ASSESSMENT and PLAN  1.  RUQ abd pain and N/V. Unclear if this is biliary or other GI issues especially given extensive med list.   I explained the anatomy and pathophysiology of biliary tract disease and cholecystitis, pancreatitis, cholangitis, choledocholithiasis.   I explained about laparoscopic possible open cholecystectomy with possible cholangiogram and the risks of surgery including but not limited to bleeding, infection, bile duct or bowel injury, hernia development, retained common duct stones requiring further therapy, non resolution of symptoms, post cholecystectomy diarrhea, DVT, and risks of general anesthesia. 2.  Bipolar disorder and borderline personality. On rx  3. Vaping. Recommended cessation  4. GERD on ranitidine    Will start with US abd.   If positive for stones, will plan for a lap/open cholecystectomy with cholangiogram  If negative will get HIDA and GI marcus Shepherd MD FACS

## (undated) DEVICE — APPLIER CLP M/L SHFT DIA5MM 15 LIG LIGAMAX 5

## (undated) DEVICE — Device

## (undated) DEVICE — SUTURE SZ 0 27IN 5/8 CIR UR-6  TAPER PT VIOLET ABSRB VICRYL J603H

## (undated) DEVICE — SYR 10ML LUER LOK 1/5ML GRAD --

## (undated) DEVICE — Z CONVERTED USE 2107985 COVER FLROSCP W36XL28IN 4 SIDE ADH

## (undated) DEVICE — REM POLYHESIVE ADULT PATIENT RETURN ELECTRODE: Brand: VALLEYLAB

## (undated) DEVICE — STERILE POLYISOPRENE POWDER-FREE SURGICAL GLOVES: Brand: PROTEXIS

## (undated) DEVICE — SOLUTION IV 250ML 0.9% SOD CHL CLR INJ FLX BG CONT PRT CLSR

## (undated) DEVICE — NEEDLE HYPO 25GA L1.5IN BVL ORIENTED ECLIPSE

## (undated) DEVICE — 3M™ TEGADERM™ TRANSPARENT FILM DRESSING FRAME STYLE, 1624W, 2-3/8 IN X 2-3/4 IN (6 CM X 7 CM), 100/CT 4CT/CASE: Brand: 3M™ TEGADERM™

## (undated) DEVICE — KENDALL DL ECG CABLE AND LEAD WIRE SYSTEM, 3-LEAD, SINGLE PATIENT USE: Brand: KENDALL

## (undated) DEVICE — DUAL LUMEN STOMACH TUBE MULTI-FUNCTIONAL PORT: Brand: SALEM SUMP

## (undated) DEVICE — HEX-LOCKING BLADE ELECTRODE: Brand: EDGE

## (undated) DEVICE — ROCKER SWITCH PENCIL BLADE ELECTRODE, HOLSTER: Brand: EDGE

## (undated) DEVICE — CONTINU-FLO SOLUTION SET, 2 INJECTION SITES, MALE LUER LOCK ADAPTER WITH RETRACTABLE COLLAR, LARGE BORE STOPCOCK WITH ROTATING MALE LUER LOCK EXTENSION SET, 2 INJECTION SITES, MALE LUER LOCK ADAPTER WITH RETRACTABLE COLLAR: Brand: INTERLINK/CONTINU-FLO

## (undated) DEVICE — SYR 20ML LL STRL LF --

## (undated) DEVICE — SURGICAL PROCEDURE KIT GEN LAPAROSCOPY LF

## (undated) DEVICE — INFECTION CONTROL KIT SYS

## (undated) DEVICE — TROCAR: Brand: KII® SLEEVE

## (undated) DEVICE — SOLUTION IRRIGATION NACL 0.9% 1000 ML FLX CONTAINER

## (undated) DEVICE — COVER LT HNDL PLAS RIG 1 PER PK

## (undated) DEVICE — TROCAR: Brand: KII FIOS FIRST ENTRY

## (undated) DEVICE — TROCARS: Brand: KII® BLUNT TIP ACCESS SYSTEM

## (undated) DEVICE — (D)PREP SKN CHLRAPRP APPL 26ML -- CONVERT TO ITEM 371833

## (undated) DEVICE — Z DISCONTINUED NO SUB IDED SET EXTN W/ 4 W STPCOCK M SPIN LOK 36IN

## (undated) DEVICE — TOWEL SURG W17XL27IN STD BLU COT NONFENESTRATED PREWASHED

## (undated) DEVICE — SUTURE VCRL SZ 4-0 L27IN ABSRB UD L19MM PS-2 3/8 CIR PRIM J426H

## (undated) DEVICE — CATHETER URET 4FR L70CM POLYUR OLV FLX TIP KINK RESIST W/

## (undated) DEVICE — SET 2ND L34IN N DEHP THE QUEENS MED CNTR VALUELINK